# Patient Record
Sex: MALE | Race: BLACK OR AFRICAN AMERICAN | Employment: OTHER | URBAN - METROPOLITAN AREA
[De-identification: names, ages, dates, MRNs, and addresses within clinical notes are randomized per-mention and may not be internally consistent; named-entity substitution may affect disease eponyms.]

---

## 2019-02-08 ENCOUNTER — HOSPITAL ENCOUNTER (EMERGENCY)
Age: 59
Discharge: HOME | End: 2019-02-09
Attending: EMERGENCY MEDICINE
Payer: MEDICAID

## 2019-02-08 ENCOUNTER — APPOINTMENT (OUTPATIENT)
Dept: GENERAL RADIOLOGY | Age: 59
End: 2019-02-08
Attending: EMERGENCY MEDICINE
Payer: MEDICAID

## 2019-02-08 DIAGNOSIS — M79.671 RIGHT FOOT PAIN: Primary | ICD-10-CM

## 2019-02-08 DIAGNOSIS — F20.9 SCHIZOPHRENIA, UNSPECIFIED TYPE (HCC): ICD-10-CM

## 2019-02-08 PROCEDURE — 99285 EMERGENCY DEPT VISIT HI MDM: CPT

## 2019-02-08 PROCEDURE — 73630 X-RAY EXAM OF FOOT: CPT

## 2019-02-09 VITALS
RESPIRATION RATE: 16 BRPM | DIASTOLIC BLOOD PRESSURE: 91 MMHG | SYSTOLIC BLOOD PRESSURE: 123 MMHG | HEIGHT: 67 IN | HEART RATE: 115 BPM | WEIGHT: 151 LBS | BODY MASS INDEX: 23.7 KG/M2 | TEMPERATURE: 99.3 F | OXYGEN SATURATION: 98 %

## 2019-02-09 NOTE — ED PROVIDER NOTES
Emergency Department Treatment Report Patient: Farida Alvarez Age: 62 y.o. Sex: male YOB: 1960 Admit Date: 2/8/2019 PCP: None MRN: [id number]  CSN: [id number] Room: [place] [address] Time Dictated: 12:04 AM   
 
Chief Complaint Chief Complaint Patient presents with  Foot Pain  Mental Health Problem History of Present Illness 62 y.o. male, PMH of schizophrenia presents with acute, moderate, aching right foot pain onset minutes PTA. Pt states his pain began when he was walking here from [facility]. He was heading to his shelter, but his feet were hurting. He reports auditory hallucinations saying they are going to kill him, but he denies SI and HI. Did not take his nighttime Trazadone or Olanzapine, but said that when he takes them, the voices go away. No modifying or alleviating factors factors were reported. No other concerns or symptoms at this time. Review of Systems Constitutional: No fever, chills, or weight loss Eyes: No visual symptoms. ENT: No sore throat, runny nose or ear pain. Respiratory: No cough, dyspnea or wheezing. Cardiovascular: No chest pain, pressure, palpitations, tightness or heaviness. Gastrointestinal: No vomiting, diarrhea or abdominal pain. Genitourinary: No dysuria, frequency, or urgency. Musculoskeletal: No joint pain or swelling. Positive for right foot pain. Integumentary: No rashes. Neurological: No headaches, sensory or motor symptoms. Psychiatric: Positive for auditory hallucinations. Negative for SI and HI Denies complaints in all other systems. Past Medical/Surgical History History reviewed. No pertinent past medical history. History reviewed. No pertinent surgical history. Social History Social History Socioeconomic History  Marital status: SINGLE Spouse name: Not on file  Number of children: Not on file  Years of education: Not on file  Highest education level: Not on file Tobacco Use  
  Smoking status: Current Every Day Smoker  Smokeless tobacco: Never Used Substance and Sexual Activity  Alcohol use: No  
  Frequency: Never  Drug use: No  
 
 
Family History History reviewed. No pertinent family history. Home Medications None Allergies Allergies Allergen Reactions  Pcn [Penicillins] Other (comments) Physical Exam  
 
ED Triage Vitals [02/08/19 2241] ED Encounter Vitals Group BP (!) 123/91 Pulse (Heart Rate) (!) 115 Resp Rate 16 Temp 99.3 °F (37.4 °C) Temp src O2 Sat (%) 98 % Weight 151 lb Height 5' 7\" Constitutional: Patient appears well developed and well nourished. Appearance and behavior are age and situation appropriate. Animated, pleasant. HEENT: Conjunctiva clear. PERRLA. Mucous membranes moist, non-erythematous. Surface of the pharynx, palate, and tongue are pink, moist and without lesions. Neck: supple, non tender, symmetrical, no masses or JVD. Respiratory: lungs clear to auscultation, nonlabored respirations. No tachypnea or accessory muscle use. Cardiovascular: heart regular rate and rhythm without murmur rubs or gallops. Calves soft and non-tender. Distal pulses 2+ and equal bilaterally. No peripheral edema. Gastrointestinal:  Abdomen soft, nontender without complaint of pain to palpation Musculoskeletal: Nail beds pink with prompt capillary refill. Calluses on right foot. Integumentary: warm and dry without rashes or lesions Neurologic: alert and oriented, Sensation intact, motor strength equal and symmetric. No facial asymmetry or dysarthria. Diagnostic Studies Lab:  
No results found for this or any previous visit (from the past 12 hour(s)). Imaging: No results found. Chantel 
 
ED Course/Medical Decision Making Pt with right foot pain after walking. No acute fracture on XR. His pain is over a callus. Foot pain is likely from walking from [facility].  Was walking to shelter, did not make it, stopped at the ED. He denies SI, HI, or increased hallucinations. He took his evening psych medications in the ED. No indications for emergent psychiatric evaluation at this time. Will discharge him and he plans to go to the shelter this morning. Medications - No data to display Final Diagnosis ICD-10-CM ICD-9-CM 1. Right foot pain M79.671 729.5 2. Schizophrenia, unspecified type ([facility] F20.9 295.90 Disposition Patient is discharged home in stable condition. Advised to follow with their primary care physician. Patient advised to return to the ER for any new or worsening symptoms. My Medications You have not been prescribed any medications. Janet Lennon MD 
February 9, 2019 My signature above authenticates this document and my orders, the final   
diagnosis (es), discharge prescription (s), and instructions in the Epic   
record. If you have any questions please contact [phone]. 
  
Nursing notes have been reviewed by the physician/ advanced practice   
Clinician. Scribe Attestation Jenny Conte acting as a scribe for and in the presence of Caroline Mccallum MD     
February 09, 2019 at 12:05 AM 
    
Provider Attestation:     
I personally performed the services described in the documentation, reviewed the documentation, as recorded by the scribe in my presence, and it accurately and completely records my words and actions.  February 09, 2019 at 12:05 AM - Caroline Mccallum MD

## 2019-02-09 NOTE — DISCHARGE INSTRUCTIONS
Patient Education        Foot Pain: Care Instructions  Your Care Instructions  Foot injuries that cause pain and swelling are fairly common. Almost all sports or home repair projects can cause a misstep that ends up as foot pain. Normal wear and tear, especially as you get older, also can cause foot pain. Most minor foot injuries will heal on their own, and home treatment is usually all you need to do. If you have a severe injury, you may need tests and treatment. Follow-up care is a key part of your treatment and safety. Be sure to make and go to all appointments, and call your doctor if you are having problems. It's also a good idea to know your test results and keep a list of the medicines you take. How can you care for yourself at home? · Take pain medicines exactly as directed. ? If the doctor gave you a prescription medicine for pain, take it as prescribed. ? If you are not taking a prescription pain medicine, ask your doctor if you can take an over-the-counter medicine. · Rest and protect your foot. Take a break from any activity that may cause pain. · Put ice or a cold pack on your foot for 10 to 20 minutes at a time. Put a thin cloth between the ice and your skin. · Prop up the sore foot on a pillow when you ice it or anytime you sit or lie down during the next 3 days. Try to keep it above the level of your heart. This will help reduce swelling. · Your doctor may recommend that you wrap your foot with an elastic bandage. Keep your foot wrapped for as long as your doctor advises. · If your doctor recommends crutches, use them as directed. · Wear roomy footwear. · As soon as pain and swelling end, begin gentle exercises of your foot. Your doctor can tell you which exercises will help. When should you call for help? Call 911 anytime you think you may need emergency care.  For example, call if:    · Your foot turns pale, white, blue, or cold.    Call your doctor now or seek immediate medical care if:    · You cannot move or stand on your foot.     · Your foot looks twisted or out of its normal position.     · Your foot is not stable when you step down.     · You have signs of infection, such as:  ? Increased pain, swelling, warmth, or redness. ? Red streaks leading from the sore area. ? Pus draining from a place on your foot. ? A fever.     · Your foot is numb or tingly.    Watch closely for changes in your health, and be sure to contact your doctor if:    · You do not get better as expected.     · You have bruises from an injury that last longer than 2 weeks. Where can you learn more? Go to http://noam-rere.info/. Enter J694 in the search box to learn more about \"Foot Pain: Care Instructions. \"  Current as of: September 20, 2018  Content Version: 11.9  © 7278-4393 [employer]. Care instructions adapted under license by [employer] (which disclaims liability or warranty for this information). If you have questions about a medical condition or this instruction, always ask your healthcare professional. [place] [address] any warranty or liability for your use of this information. Patient Education        Schizophrenia: Care Instructions  Your Care Instructions  Schizophrenia is a disease that makes it hard to think clearly, manage emotions, and interact with other people. It can cause:  · Delusions. These are beliefs that are not real.  · Hallucinations. These are things that you may see or hear that are not really there. · Paranoia. This is the belief that others are lying, cheating, using you, or trying to harm you. The disease may change your ability to enjoy life, express emotions, or function. At times, you may hear voices, behave strangely, have trouble speaking or understanding speech, or keep to yourself. The goal of treatment is to lower your stress and help your brain function normally.  You may need lifelong treatment with medicines and counseling to keep your schizophrenia under control. When schizophrenia is not treated, the risks are higher for suicide, a hospital stay, and other problems. Early treatment called coordinated specialty care [facility]) may help a person who is having his or her first episode of psychotic thoughts. Ask your doctor about [address]. Follow-up care is a key part of your treatment and safety. Be sure to make and go to all appointments, and call your doctor if you are having problems. It's also a good idea to know your test results and keep a list of the medicines you take. How can you care for yourself at home? · Be safe with medicines. Take your medicines exactly as prescribed. Call your doctor if you think you are having a problem with your medicine. When you feel good, you may think that you do not need your medicines. But it is important to keep taking them as scheduled. · Go to your counseling sessions. Call and talk with your counselor if you can't attend or if you don't think the sessions are helping. Do not just stop going. · Eat a healthy diet. Talk with a dietitian about what type of diet may be best for you. · Do not use alcohol or illegal drugs. · Keep the numbers for these national suicide hotlines: 7-515-280-TALK (2-945.409.9751) and 3-647-NVEWFJJ (8-665.360.3244). If you or someone you know talks about suicide or feeling hopeless, get help right away. What should you do if someone in your family has schizophrenia? · Learn about the disease and how it may get worse over time. · Remind your family member that you love him or her. · Make a plan with all family members about how to take care of your loved one when his or her symptoms are bad. · Talk about your fears and concerns and those of other family members. Seek counseling if needed. · Do not focus attention only on the person who is in treatment. · Remind yourself that it will take time for changes to occur.   · Do not blame yourself for the disease. · Know your legal rights and the legal rights of your family member. · Take care of yourself. Stay involved with your own interests, such as your career, hobbies, and friends. Use exercise, positive self-talk, relaxation, and deep breathing to help lower your stress. · Give yourself time to grieve. You may need to deal with emotions such as anger, fear, and frustration. After you work through your feelings, you will be better able to care for yourself and your family. · If you are having a hard time with your feelings and your interactions with your family member, talk with a counselor. When should you call for help? Call 911 anytime you think you may need emergency care. For example, call if:    · You are thinking about suicide or are threatening suicide.     · You feel like hurting yourself or someone else.    Call your doctor now or seek immediate medical care if:    · You hear voices.     · You think someone is trying to harm you.     · You cannot concentrate or are easily confused.    Watch closely for changes in your health, and be sure to contact your doctor if:    · You are having trouble taking care of yourself.     · You cannot attend your counseling sessions. Where can you learn more? Go to http://noam-rere.info/. Enter S742 in the search box to learn more about \"Schizophrenia: Care Instructions. \"  Current as of: September 11, 2018  Content Version: 11.9  © 0628-5344 [employer]. Care instructions adapted under license by [employer] (which disclaims liability or warranty for this information). If you have questions about a medical condition or this instruction, always ask your healthcare professional. [address] any warranty or liability for your use of this information.

## 2019-02-09 NOTE — ED TRIAGE NOTES
Right foot pain began while walking here from [facility]. States he can't make it to the shelter. Reports hearing voices saying they are going to kill him. Denies SI/HI.

## 2019-02-09 NOTE — ED NOTES
Patient willingly changed into paper gown and was compliant with belongings being checked and safely secured in locker #2. Patient belongings include: 
 
-One wallet 
-One specimen container containing (2) silver rings 
-One large blue jacket -(2) T-shirts, grey and red 
-One pair of blue sweatpants 
-One pair of grey underwear 
-One pair of tennis shoes 
-One hat 
-One trashbag brought in by patient, with patient's name written in marker on tape label

## 2019-02-12 ENCOUNTER — HOSPITAL ENCOUNTER (EMERGENCY)
Age: 59
Discharge: PSYCHIATRIC OR SUBSTANCE USE FACILITY | End: 2019-02-15
Attending: EMERGENCY MEDICINE | Admitting: EMERGENCY MEDICINE
Payer: MEDICAID

## 2019-02-12 ENCOUNTER — APPOINTMENT (OUTPATIENT)
Dept: GENERAL RADIOLOGY | Age: 59
End: 2019-02-12
Attending: EMERGENCY MEDICINE
Payer: MEDICAID

## 2019-02-12 DIAGNOSIS — R44.3 HALLUCINATIONS: Primary | ICD-10-CM

## 2019-02-12 DIAGNOSIS — J10.1 INFLUENZA A: ICD-10-CM

## 2019-02-12 LAB
ALBUMIN SERPL-MCNC: 3.2 G/DL (ref 3.4–5)
ALBUMIN/GLOB SERPL: 0.8 {RATIO} (ref 0.8–1.7)
ALP SERPL-CCNC: 82 U/L (ref 45–117)
ALT SERPL-CCNC: 59 U/L (ref 16–61)
AMPHET UR QL SCN: NEGATIVE
ANION GAP SERPL CALC-SCNC: 8 MMOL/L (ref 3–18)
APPEARANCE UR: CLEAR
AST SERPL-CCNC: 115 U/L (ref 15–37)
BARBITURATES UR QL SCN: NEGATIVE
BASOPHILS # BLD: 0 K/UL (ref 0–0.1)
BASOPHILS NFR BLD: 0 % (ref 0–2)
BENZODIAZ UR QL: NEGATIVE
BILIRUB SERPL-MCNC: 0.3 MG/DL (ref 0.2–1)
BILIRUB UR QL: NEGATIVE
BUN SERPL-MCNC: 14 MG/DL (ref 7–18)
BUN/CREAT SERPL: 12 (ref 12–20)
CALCIUM SERPL-MCNC: 8.4 MG/DL (ref 8.5–10.1)
CANNABINOIDS UR QL SCN: NEGATIVE
CHLORIDE SERPL-SCNC: 102 MMOL/L (ref 100–108)
CO2 SERPL-SCNC: 26 MMOL/L (ref 21–32)
COCAINE UR QL SCN: NEGATIVE
COLOR UR: YELLOW
CREAT SERPL-MCNC: 1.19 MG/DL (ref 0.6–1.3)
DIFFERENTIAL METHOD BLD: ABNORMAL
EOSINOPHIL # BLD: 0.1 K/UL (ref 0–0.4)
EOSINOPHIL NFR BLD: 1 % (ref 0–5)
ERYTHROCYTE [DISTWIDTH] IN BLOOD BY AUTOMATED COUNT: 13.1 % (ref 11.6–14.5)
ETHANOL SERPL-MCNC: <3 MG/DL (ref 0–3)
FLUAV AG NPH QL IA: POSITIVE
FLUBV AG NOSE QL IA: NEGATIVE
GLOBULIN SER CALC-MCNC: 3.9 G/DL (ref 2–4)
GLUCOSE SERPL-MCNC: 116 MG/DL (ref 74–99)
GLUCOSE UR STRIP.AUTO-MCNC: NEGATIVE MG/DL
HCT VFR BLD AUTO: 35.2 % (ref 36–48)
HDSCOM,HDSCOM: NORMAL
HGB BLD-MCNC: 11.5 G/DL (ref 13–16)
HGB UR QL STRIP: NEGATIVE
KETONES UR QL STRIP.AUTO: NEGATIVE MG/DL
LACTATE BLD-SCNC: 0.71 MMOL/L (ref 0.4–2)
LEUKOCYTE ESTERASE UR QL STRIP.AUTO: NEGATIVE
LYMPHOCYTES # BLD: 2 K/UL (ref 0.9–3.6)
LYMPHOCYTES NFR BLD: 35 % (ref 21–52)
MCH RBC QN AUTO: 28.8 PG (ref 24–34)
MCHC RBC AUTO-ENTMCNC: 32.7 G/DL (ref 31–37)
MCV RBC AUTO: 88 FL (ref 74–97)
METHADONE UR QL: NEGATIVE
MONOCYTES # BLD: 1.2 K/UL (ref 0.05–1.2)
MONOCYTES NFR BLD: 21 % (ref 3–10)
NEUTS SEG # BLD: 2.4 K/UL (ref 1.8–8)
NEUTS SEG NFR BLD: 43 % (ref 40–73)
NITRITE UR QL STRIP.AUTO: NEGATIVE
OPIATES UR QL: NEGATIVE
PCP UR QL: NEGATIVE
PH UR STRIP: 6.5 [PH] (ref 5–8)
PLATELET # BLD AUTO: 175 K/UL (ref 135–420)
PMV BLD AUTO: 9.8 FL (ref 9.2–11.8)
POTASSIUM SERPL-SCNC: 3.2 MMOL/L (ref 3.5–5.5)
PROT SERPL-MCNC: 7.1 G/DL (ref 6.4–8.2)
PROT UR STRIP-MCNC: NEGATIVE MG/DL
RBC # BLD AUTO: 4 M/UL (ref 4.7–5.5)
SODIUM SERPL-SCNC: 136 MMOL/L (ref 136–145)
SP GR UR REFRACTOMETRY: 1.01 (ref 1–1.03)
UROBILINOGEN UR QL STRIP.AUTO: 1 EU/DL (ref 0.2–1)
VALPROATE SERPL-MCNC: 4 UG/ML (ref 50–100)
WBC # BLD AUTO: 5.7 K/UL (ref 4.6–13.2)

## 2019-02-12 PROCEDURE — 87040 BLOOD CULTURE FOR BACTERIA: CPT

## 2019-02-12 PROCEDURE — 87804 INFLUENZA ASSAY W/OPTIC: CPT

## 2019-02-12 PROCEDURE — 81003 URINALYSIS AUTO W/O SCOPE: CPT

## 2019-02-12 PROCEDURE — 96374 THER/PROPH/DIAG INJ IV PUSH: CPT

## 2019-02-12 PROCEDURE — 85025 COMPLETE CBC W/AUTO DIFF WBC: CPT

## 2019-02-12 PROCEDURE — 80307 DRUG TEST PRSMV CHEM ANLYZR: CPT

## 2019-02-12 PROCEDURE — 74011250636 HC RX REV CODE- 250/636: Performed by: EMERGENCY MEDICINE

## 2019-02-12 PROCEDURE — 80053 COMPREHEN METABOLIC PANEL: CPT

## 2019-02-12 PROCEDURE — 83605 ASSAY OF LACTIC ACID: CPT

## 2019-02-12 PROCEDURE — 74011250637 HC RX REV CODE- 250/637: Performed by: EMERGENCY MEDICINE

## 2019-02-12 PROCEDURE — 71046 X-RAY EXAM CHEST 2 VIEWS: CPT

## 2019-02-12 PROCEDURE — 99285 EMERGENCY DEPT VISIT HI MDM: CPT

## 2019-02-12 PROCEDURE — 80164 ASSAY DIPROPYLACETIC ACD TOT: CPT

## 2019-02-12 PROCEDURE — 96361 HYDRATE IV INFUSION ADD-ON: CPT

## 2019-02-12 RX ORDER — ACETAMINOPHEN 500 MG
1000 TABLET ORAL
Status: COMPLETED | OUTPATIENT
Start: 2019-02-12 | End: 2019-02-12

## 2019-02-12 RX ORDER — OSELTAMIVIR PHOSPHATE 75 MG/1
75 CAPSULE ORAL 2 TIMES DAILY
Qty: 9 CAP | Refills: 0 | Status: SHIPPED | OUTPATIENT
Start: 2019-02-13 | End: 2019-02-15

## 2019-02-12 RX ORDER — GUAIFENESIN/DEXTROMETHORPHAN 100-10MG/5
10 SYRUP ORAL
Status: DISCONTINUED | OUTPATIENT
Start: 2019-02-12 | End: 2019-02-15 | Stop reason: HOSPADM

## 2019-02-12 RX ORDER — MAGNESIUM SULFATE HEPTAHYDRATE 40 MG/ML
2 INJECTION, SOLUTION INTRAVENOUS ONCE
Status: COMPLETED | OUTPATIENT
Start: 2019-02-12 | End: 2019-02-12

## 2019-02-12 RX ORDER — OSELTAMIVIR PHOSPHATE 75 MG/1
75 CAPSULE ORAL 2 TIMES DAILY
Status: DISCONTINUED | OUTPATIENT
Start: 2019-02-12 | End: 2019-02-15 | Stop reason: HOSPADM

## 2019-02-12 RX ORDER — POTASSIUM CHLORIDE 20 MEQ/1
40 TABLET, EXTENDED RELEASE ORAL
Status: COMPLETED | OUTPATIENT
Start: 2019-02-12 | End: 2019-02-12

## 2019-02-12 RX ORDER — GUAIFENESIN/DEXTROMETHORPHAN 100-10MG/5
10 SYRUP ORAL
Status: COMPLETED | OUTPATIENT
Start: 2019-02-12 | End: 2019-02-12

## 2019-02-12 RX ORDER — ACETAMINOPHEN 500 MG
1000 TABLET ORAL
Status: DISCONTINUED | OUTPATIENT
Start: 2019-02-12 | End: 2019-02-15 | Stop reason: HOSPADM

## 2019-02-12 RX ADMIN — MAGNESIUM SULFATE HEPTAHYDRATE 2 G: 40 INJECTION, SOLUTION INTRAVENOUS at 22:25

## 2019-02-12 RX ADMIN — OSELTAMIVIR PHOSPHATE 75 MG: 75 CAPSULE ORAL at 21:19

## 2019-02-12 RX ADMIN — GUAIFENESIN AND DEXTROMETHORPHAN 10 ML: 100; 10 SYRUP ORAL at 21:19

## 2019-02-12 RX ADMIN — SODIUM CHLORIDE 1000 ML: 900 INJECTION, SOLUTION INTRAVENOUS at 20:28

## 2019-02-12 RX ADMIN — ACETAMINOPHEN 1000 MG: 500 TABLET, FILM COATED ORAL at 20:27

## 2019-02-12 RX ADMIN — POTASSIUM CHLORIDE 40 MEQ: 20 TABLET, EXTENDED RELEASE ORAL at 22:25

## 2019-02-13 PROCEDURE — 74011250637 HC RX REV CODE- 250/637: Performed by: EMERGENCY MEDICINE

## 2019-02-13 RX ORDER — RISPERIDONE 2 MG/1
6 TABLET, FILM COATED ORAL
Status: DISCONTINUED | OUTPATIENT
Start: 2019-02-13 | End: 2019-02-15 | Stop reason: HOSPADM

## 2019-02-13 RX ORDER — OLANZAPINE 10 MG/1
40 TABLET ORAL EVERY EVENING
Status: DISCONTINUED | OUTPATIENT
Start: 2019-02-13 | End: 2019-02-15 | Stop reason: HOSPADM

## 2019-02-13 RX ORDER — TOPIRAMATE 25 MG/1
50 TABLET ORAL 2 TIMES DAILY WITH MEALS
Status: DISCONTINUED | OUTPATIENT
Start: 2019-02-13 | End: 2019-02-15 | Stop reason: HOSPADM

## 2019-02-13 RX ORDER — TRAZODONE HYDROCHLORIDE 50 MG/1
100 TABLET ORAL
Status: DISCONTINUED | OUTPATIENT
Start: 2019-02-13 | End: 2019-02-15 | Stop reason: HOSPADM

## 2019-02-13 RX ORDER — BENZTROPINE MESYLATE 1 MG/1
1 TABLET ORAL 2 TIMES DAILY
Status: DISCONTINUED | OUTPATIENT
Start: 2019-02-13 | End: 2019-02-15 | Stop reason: HOSPADM

## 2019-02-13 RX ADMIN — TRAZODONE HYDROCHLORIDE 100 MG: 50 TABLET ORAL at 22:08

## 2019-02-13 RX ADMIN — OLANZAPINE 40 MG: 10 TABLET, FILM COATED ORAL at 20:50

## 2019-02-13 RX ADMIN — TOPIRAMATE 50 MG: 25 TABLET, FILM COATED ORAL at 18:53

## 2019-02-13 RX ADMIN — OSELTAMIVIR PHOSPHATE 75 MG: 75 CAPSULE ORAL at 09:17

## 2019-02-13 RX ADMIN — RISPERIDONE 6 MG: 2 TABLET ORAL at 20:48

## 2019-02-13 RX ADMIN — BENZTROPINE MESYLATE 1 MG: 1 TABLET ORAL at 18:54

## 2019-02-13 RX ADMIN — OSELTAMIVIR PHOSPHATE 75 MG: 75 CAPSULE ORAL at 18:54

## 2019-02-13 RX ADMIN — GUAIFENESIN AND DEXTROMETHORPHAN 10 ML: 100; 10 SYRUP ORAL at 10:34

## 2019-02-13 NOTE — PROGRESS NOTES
Spoke with Austin Mejia and states can't block a room for pt. She will call [facility] tomorrow and see why they can't accept pt. She will discuss case with [facility] tomorrow.

## 2019-02-13 NOTE — PROGRESS NOTES
Spoke with Milton Mcdonald from [place] [address] and states that [facility] will not accept pt due to +flu. ED MD made aware and requested for a tele psych consult and ordered.

## 2019-02-13 NOTE — ED NOTES
Purposeful rounding completed: 
 
Side rails up x 2:  YES Bed in low position and wheels locked: YES Call bell within reach: YES Comfort addressed: YES Toileting needs addressed: YES Plan of care reviewed/updated with patient and or family members: YES 
IV site assessed: YES Pain assessed and addressed: YES, 0 Sitter at bedside

## 2019-02-13 NOTE — ED NOTES
Purposeful rounding completed: 
 
Side rails up x 2:  YES Bed in low position and wheels locked: YES Call bell within reach: YES Comfort addressed: YES Toileting needs addressed: YES Plan of care reviewed/updated with patient and or family members: YES 
IV site assessed: YES Pain assessed and addressed: YES, 0

## 2019-02-13 NOTE — ED NOTES
Spoke to [place], [facility] at with [employer] She needs lab results in order to present case and possibly get a bed for this pt. Will fax his lab results to [phone]
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
## 2019-02-13 NOTE — ED NOTES
Vitals: 
Patient Vitals for the past 12 hrs: 
 Temp BP SpO2  
02/13/19 2052 98 °F (36.7 °C)    
02/13/19 2000  97/53 99 % Medications ordered:  
Medications  
oseltamivir (TAMIFLU) capsule 75 mg (75 mg Oral Given 2/13/19 1854) acetaminophen (TYLENOL) tablet 1,000 mg (not administered) guaiFENesin-dextromethorphan (ROBITUSSIN DM) 100-10 mg/5 mL syrup 10 mL (10 mL Oral Given 2/13/19 1034)  
benztropine (COGENTIN) tablet 1 mg (1 mg Oral Given 2/13/19 1854) risperiDONE (RisperDAL) tablet 6 mg (0 mg Oral Held 2/13/19 2200) topiramate (TOPAMAX) tablet 50 mg (50 mg Oral Given 2/13/19 1853) traZODone (DESYREL) tablet 100 mg (100 mg Oral Given 2/13/19 2208) OLANZapine (ZyPREXA) tablet 40 mg (40 mg Oral Given 2/13/19 2050)  
acetaminophen (TYLENOL) tablet 1,000 mg (1,000 mg Oral Given 2/12/19 2027)  
sodium chloride 0.9 % bolus infusion 1,000 mL (0 mL IntraVENous IV Completed 2/12/19 2128) guaiFENesin-dextromethorphan (ROBITUSSIN DM) 100-10 mg/5 mL syrup 10 mL (10 mL Oral Given 2/12/19 2119) potassium chloride (K-DUR, KLOR-CON) SR tablet 40 mEq (40 mEq Oral Given 2/12/19 2225)  
magnesium sulfate 2 g/50 ml IVPB (premix or compounded) (0 g IntraVENous IV Completed 2/12/19 2240) Lab findings: 
No results found for this or any previous visit (from the past 12 hour(s)). EKG interpretation by ED Physician: X-Ray, CT or other radiology findings or impressions: 
XR CHEST PA LAT Final Result IMPRESSION:  
  
No acute cardiopulmonary disease. Preliminary report provided by on-call radiology resident. Progress notes, Consult notes or additional Procedure notes:  
T/o from dr Rachael Dupree. Per repeat conversation with [facility] today will not take at [facility] given active influenza Needs documentation that pt not infective Reviewed telepsych consult and placed back on his meds that were obtained from [facility] Will get ID consult in am for opinion regarding pt infective state Will t/o dr Melanie Oconnell to f/u and obtain consult Reevaluation of patient:  
stable Disposition: 
Diagnosis: 1. Hallucinations 2. Influenza A Disposition: pending Follow-up Information None Medication List  
  
START taking these medications   
oseltamivir 75 mg capsule Commonly known as:  TAMIFLU Take 1 Cap by mouth two (2) times a day for 9 doses. Where to Get Your Medications Information about where to get these medications is not yet available Ask your nurse or doctor about these medications · oseltamivir 75 mg capsule

## 2019-02-13 NOTE — PROGRESS NOTES
Jeff Humphries from [facility] left a message and states that Farhad Doyle, director, is working on the case.

## 2019-02-13 NOTE — ED NOTES
Purposeful rounding completed: 
 
Side rails up x 2:  YES Bed in low position and wheels locked: YES Call bell within reach: YES Comfort addressed: YES Toileting needs addressed: YES Plan of care reviewed/updated with patient and or family members: YES 
IV site assessed: YES Pain assessed and addressed: YES, 0 Pt on droplet precautions, pt resting quietly; urinal at bedside, sitter at bedside

## 2019-02-13 NOTE — PROGRESS NOTES
Seen by tele psych and recommends inpt admission. Referred to [facility] and pt information given to [facility] at .

## 2019-02-13 NOTE — ED PROVIDER NOTES
Kaylyn Mendoza is a 62 y.o. Male who was brought in by police after [facility] rec he come in for medical clearance for c[facility] placement as he has been hearing voices, not acting right. Per pt he has been off meds recently. Also with cough, congestion for last day as well. States he was at [facility] earlier but they did not give him anything for his cough. Denies fcs, nvd, headache, sore throat, recent admission. Denies si, hi.  
 
 
The history is provided by the patient and the police. History reviewed. No pertinent past medical history. History reviewed. No pertinent surgical history. History reviewed. No pertinent family history. Social History Socioeconomic History  Marital status: SINGLE Spouse name: Not on file  Number of children: Not on file  Years of education: Not on file  Highest education level: Not on file Social Needs  Financial resource strain: Not on file  Food insecurity - worry: Not on file  Food insecurity - inability: Not on file  Transportation needs - medical: Not on file  Transportation needs - non-medical: Not on file Occupational History  Not on file Tobacco Use  Smoking status: Current Every Day Smoker  Smokeless tobacco: Never Used Substance and Sexual Activity  Alcohol use: No  
  Frequency: Never  Drug use: No  
 Sexual activity: Not on file Other Topics Concern  Not on file Social History Narrative  Not on file ALLERGIES: Pcn [penicillins] Review of Systems Constitutional: Positive for chills. HENT: Positive for congestion. Negative for sore throat and trouble swallowing. Eyes: Negative for visual disturbance. Respiratory: Positive for cough. Negative for shortness of breath and wheezing. Cardiovascular: Negative for chest pain. Gastrointestinal: Negative for abdominal pain, diarrhea, nausea and vomiting. Endocrine: Negative for polyuria. Genitourinary: Negative for difficulty urinating, dysuria and urgency. Musculoskeletal: Negative for gait problem. Skin: Negative for rash. Allergic/Immunologic: Negative for immunocompromised state. Neurological: Negative for dizziness, syncope and light-headedness. Psychiatric/Behavioral: Positive for hallucinations and sleep disturbance. Vitals:  
 02/12/19 1952 02/12/19 2115 02/12/19 2130 02/12/19 2143 BP: 121/82 119/68 117/73 Pulse: (!) 108 (!) 102 86 Resp: 16 26 18 Temp: (!) 101.2 °F (38.4 °C)   98.9 °F (37.2 °C) SpO2: 95% 98% 100% Weight: 67.8 kg (149 lb 8 oz) Height: 5' 7\" (1.702 m) Physical Exam  
Constitutional: He is oriented to person, place, and time. Non-toxic appearance. He does not have a sickly appearance. He does not appear ill. No distress. HENT:  
Head: Normocephalic and atraumatic. Right Ear: External ear normal.  
Left Ear: External ear normal.  
Nose: Nose normal.  
Mouth/Throat: Oropharynx is clear and moist. No oropharyngeal exudate. Eyes: Conjunctivae are normal. Right eye exhibits no discharge. Left eye exhibits no discharge. No scleral icterus. Neck: Normal range of motion. Cardiovascular: Normal rate, regular rhythm, normal heart sounds and intact distal pulses. Pulmonary/Chest: Effort normal and breath sounds normal. No stridor. No respiratory distress. He has no wheezes. He has no rales. Abdominal: Soft. There is no tenderness. Musculoskeletal: Normal range of motion. He exhibits no edema. Lymphadenopathy:  
  He has no cervical adenopathy. Neurological: He is alert and oriented to person, place, and time. Skin: Skin is warm and dry. Capillary refill takes less than 2 seconds. No rash noted. He is not diaphoretic. Psychiatric: He has a normal mood and affect. His behavior is normal. His speech is not rapid and/or pressured.  Thought content is delusional. He expresses no homicidal and no suicidal ideation. He expresses no suicidal plans and no homicidal plans. Nursing note and vitals reviewed. MDM Procedures Vitals: 
Patient Vitals for the past 12 hrs: 
 Temp Pulse Resp BP SpO2  
02/12/19 2143 98.9 °F (37.2 °C)      
02/12/19 2130  86 18 117/73 100 % 02/12/19 2115  (!) 102 26 119/68 98 % 02/12/19 1952 (!) 101.2 °F (38.4 °C) (!) 108 16 121/82 95 % Medications ordered:  
Medications  
oseltamivir (TAMIFLU) capsule 75 mg (75 mg Oral Given 2/12/19 2119)  
magnesium sulfate 2 g/50 ml IVPB (premix or compounded) (2 g IntraVENous New Bag 2/12/19 2225)  
acetaminophen (TYLENOL) tablet 1,000 mg (1,000 mg Oral Given 2/12/19 2027)  
sodium chloride 0.9 % bolus infusion 1,000 mL (0 mL IntraVENous IV Completed 2/12/19 2128) guaiFENesin-dextromethorphan (ROBITUSSIN DM) 100-10 mg/5 mL syrup 10 mL (10 mL Oral Given 2/12/19 2119) potassium chloride (K-DUR, KLOR-CON) SR tablet 40 mEq (40 mEq Oral Given 2/12/19 2225) Lab findings: 
Recent Results (from the past 12 hour(s)) INFLUENZA A & B AG (RAPID TEST) Collection Time: 02/12/19  8:15 PM  
Result Value Ref Range Influenza A Antigen POSITIVE (A) NEG Influenza B Antigen NEGATIVE  NEG    
CBC WITH AUTOMATED DIFF Collection Time: 02/12/19  8:20 PM  
Result Value Ref Range WBC 5.7 4.6 - 13.2 K/uL  
 RBC 4.00 (L) 4.70 - 5.50 M/uL  
 HGB 11.5 (L) 13.0 - 16.0 g/dL HCT 35.2 (L) 36.0 - 48.0 % MCV 88.0 74.0 - 97.0 FL  
 MCH 28.8 24.0 - 34.0 PG  
 MCHC 32.7 31.0 - 37.0 g/dL  
 RDW 13.1 11.6 - 14.5 % PLATELET 028 262 - 683 K/uL MPV 9.8 9.2 - 11.8 FL  
 NEUTROPHILS 43 40 - 73 % LYMPHOCYTES 35 21 - 52 % MONOCYTES 21 (H) 3 - 10 % EOSINOPHILS 1 0 - 5 % BASOPHILS 0 0 - 2 %  
 ABS. NEUTROPHILS 2.4 1.8 - 8.0 K/UL  
 ABS. LYMPHOCYTES 2.0 0.9 - 3.6 K/UL  
 ABS. MONOCYTES 1.2 0.05 - 1.2 K/UL  
 ABS. EOSINOPHILS 0.1 0.0 - 0.4 K/UL  
 ABS. BASOPHILS 0.0 0.0 - 0.1 K/UL DF AUTOMATED METABOLIC PANEL, COMPREHENSIVE Collection Time: 02/12/19  8:20 PM  
Result Value Ref Range Sodium 136 136 - 145 mmol/L Potassium 3.2 (L) 3.5 - 5.5 mmol/L Chloride 102 100 - 108 mmol/L  
 CO2 26 21 - 32 mmol/L Anion gap 8 3.0 - 18 mmol/L Glucose 116 (H) 74 - 99 mg/dL BUN 14 7.0 - 18 MG/DL Creatinine 1.19 0.6 - 1.3 MG/DL  
 BUN/Creatinine ratio 12 12 - 20 GFR est AA >60 >60 ml/min/1.73m2 GFR est non-AA >60 >60 ml/min/1.73m2 Calcium 8.4 (L) 8.5 - 10.1 MG/DL Bilirubin, total 0.3 0.2 - 1.0 MG/DL  
 ALT (SGPT) 59 16 - 61 U/L  
 AST (SGOT) 115 (H) 15 - 37 U/L Alk. phosphatase 82 45 - 117 U/L Protein, total 7.1 6.4 - 8.2 g/dL Albumin 3.2 (L) 3.4 - 5.0 g/dL Globulin 3.9 2.0 - 4.0 g/dL A-G Ratio 0.8 0.8 - 1.7 ETHYL ALCOHOL Collection Time: 02/12/19  8:20 PM  
Result Value Ref Range ALCOHOL(ETHYL),SERUM <3 0 - 3 MG/DL  
DRUG SCREEN, URINE Collection Time: 02/12/19  8:20 PM  
Result Value Ref Range BENZODIAZEPINES NEGATIVE  NEG    
 BARBITURATES NEGATIVE  NEG    
 THC (TH-CANNABINOL) NEGATIVE  NEG    
 OPIATES NEGATIVE  NEG    
 PCP(PHENCYCLIDINE) NEGATIVE  NEG    
 COCAINE NEGATIVE  NEG    
 AMPHETAMINES NEGATIVE  NEG METHADONE NEGATIVE  NEG HDSCOM (NOTE) URINALYSIS W/ RFLX MICROSCOPIC Collection Time: 02/12/19  8:20 PM  
Result Value Ref Range Color YELLOW Appearance CLEAR Specific gravity 1.006 1.005 - 1.030    
 pH (UA) 6.5 5.0 - 8.0 Protein NEGATIVE  NEG mg/dL Glucose NEGATIVE  NEG mg/dL Ketone NEGATIVE  NEG mg/dL Bilirubin NEGATIVE  NEG Blood NEGATIVE  NEG Urobilinogen 1.0 0.2 - 1.0 EU/dL Nitrites NEGATIVE  NEG Leukocyte Esterase NEGATIVE  NEG    
VALPROIC ACID Collection Time: 02/12/19  8:20 PM  
Result Value Ref Range Valproic acid 4 (L) 50 - 100 ug/ml POC LACTIC ACID Collection Time: 02/12/19  8:22 PM  
Result Value Ref Range Lactic Acid (POC) 0.71 0.40 - 2.00 mmol/L  
 
 
EKG interpretation by ED Physician: X-Ray, CT or other radiology findings or impressions: 
XR CHEST PA LAT    (Results Pending)  
nap per prelim reading Progress notes, Consult notes or additional Procedure notes:  
Looks well. Doubt need for medical admission. D/w [facility] and active influenza not contraindicated for [facility] placement Reevaluation of patient:  
Stable with no acute medical condition that would prevent transfer to mental health facility Disposition: 
Diagnosis: 1. Hallucinations 2. Influenza A Disposition: transfer csu when bed available Follow-up Information None Medication List  
  
START taking these medications   
oseltamivir 75 mg capsule Commonly known as:  TAMIFLU Take 1 Cap by mouth two (2) times a day for 9 doses. Start taking on:  2/13/2019 Where to Get Your Medications Information about where to get these medications is not yet available Ask your nurse or doctor about these medications · oseltamivir 75 mg capsule

## 2019-02-14 PROCEDURE — 74011250637 HC RX REV CODE- 250/637: Performed by: EMERGENCY MEDICINE

## 2019-02-14 RX ADMIN — OSELTAMIVIR PHOSPHATE 75 MG: 75 CAPSULE ORAL at 20:17

## 2019-02-14 RX ADMIN — OLANZAPINE 40 MG: 10 TABLET, FILM COATED ORAL at 21:04

## 2019-02-14 RX ADMIN — TOPIRAMATE 50 MG: 25 TABLET, FILM COATED ORAL at 20:17

## 2019-02-14 RX ADMIN — BENZTROPINE MESYLATE 1 MG: 1 TABLET ORAL at 11:36

## 2019-02-14 RX ADMIN — TOPIRAMATE 50 MG: 25 TABLET, FILM COATED ORAL at 11:36

## 2019-02-14 RX ADMIN — OSELTAMIVIR PHOSPHATE 75 MG: 75 CAPSULE ORAL at 11:36

## 2019-02-14 RX ADMIN — BENZTROPINE MESYLATE 1 MG: 1 TABLET ORAL at 20:17

## 2019-02-14 NOTE — ED NOTES
Spoke with Pavithra Espino from [facility] due to the patient flu status and the need for droplet precautions recommendation until 2/17 per the ID attending, they patient is unable to be accepted at this time.

## 2019-02-14 NOTE — CONSULTS
INFECTIOUS DISEASE CONSULT NOTE       Requested by: Dr Vamshi Grover    Reason for consult: Influenza and isolation need    Date of admission: 2/12/2019    Date of consult: February 14, 2019      ABX:     Current abx Prior abx    Tamiflu 2/13-1      ASSESSMENT: -- RECOMMENDATIONS      Influenza A  - symptom onset 2/10- sudden  - presented with fever, cough, headaches and sore throat  - CXR with no pneumonia  - suspect non complicated   - Continue Tamiflu and plan 5 days course  - Continue droplet precautions  - Pt can be transfered to [facility] but as per CDC guidelines- \"Droplet precautions should be implemented for 7 days after illness onset or until 24 hours after the resolution of fever and respiratory symptoms, whichever is longer, while a patient is in a healthcare facility\"  - pt is afebrile for >24 hrs and will need droplet precautions until 2/17 (unless re-spikes or can be discharged home)  - d/w Dr Vamshi Grover   Schizoaffective disorder- pt with hallucination  - evaluated by Psych  - Plan for transfer to [facility]                             MICROBIOLOGY:     2/12 Influenza A +ve    LINES/DRAINS:     PIV    HPI:     Mr. Andrew Tam is a 62 y.o. Male with PMH of Schizoaffective disorder who was brought to ED2/12 for evaluation as has been acting differently. Pt is not able to provide good history. He was found by police as hearing voices, not acting right. Per pt he has been off his psych meds recently. He c/o cough, sore throat, congestion and high grade fever of 1 day duration and went to [facility] but nothing specific was done there. He was found by police and brought to [place] [address] ED for evaluation. He was tested for Influenza in ED and was found positive for A. His CXR was neg for any infiltrates. He was started on Tamiflu. He was evaluated by Psych and recommended to transfer to [place] [id number]. Facility is refusing transfer given Influenza.  We were asked for further eval and management. Past medical history:   History reviewed. No pertinent past medical history. Schizoaffective disorder    Social History:     Social History     Socioeconomic History    Marital status: SINGLE     Spouse name: Not on file    Number of children: Not on file    Years of education: Not on file    Highest education level: Not on file   Social Needs    Financial resource strain: Not on file    Food insecurity - worry: Not on file    Food insecurity - inability: Not on file   [employer] needs - medical: Not on file   [employer] needs - non-medical: Not on file   Occupational History    Not on file   Tobacco Use    Smoking status: Current Every Day Smoker    Smokeless tobacco: Never Used   Substance and Sexual Activity    Alcohol use: No     Frequency: Never    Drug use: No    Sexual activity: Not on file   Other Topics Concern    Not on file   Social History Narrative    Not on file       Family History:   History reviewed. No pertinent family history. Allergies: Allergies   Allergen Reactions    Pcn [Penicillins] Other (comments)         Home Medications:     (Not in a hospital admission)    Current Medications:     Current Facility-Administered Medications   Medication Dose Route Frequency    benztropine (COGENTIN) tablet 1 mg  1 mg Oral BID    risperiDONE (RisperDAL) tablet 6 mg  6 mg Oral QHS    topiramate (TOPAMAX) tablet 50 mg  50 mg Oral BID WITH MEALS    traZODone (DESYREL) tablet 100 mg  100 mg Oral QHS    OLANZapine (ZyPREXA) tablet 40 mg  40 mg Oral QPM    oseltamivir (TAMIFLU) capsule 75 mg  75 mg Oral BID    acetaminophen (TYLENOL) tablet 1,000 mg  1,000 mg Oral Q6H PRN    guaiFENesin-dextromethorphan (ROBITUSSIN DM) 100-10 mg/5 mL syrup 10 mL  10 mL Oral Q6H PRN     Current Outpatient Medications   Medication Sig Dispense    oseltamivir (TAMIFLU) 75 mg capsule Take 1 Cap by mouth two (2) times a day for 9 doses.  9 Cap       Review of Systems: 12 points ROS done though limited given is mental condition. Pertinent positives and negatives are as follows, ROS otherwise negative. Constitutional: +ve for fever, chills, neg diaphoresis and unexpected weight change. HENT: Negative for ear pain, congestion, +ve sore throat, neg rhinorrhea. Eyes: Negative for pain, redness and visual disturbance. Respiratory: negative for shortness of breath, chest tightness, wheezing. +ve cough,   Cardiovascular: Negative for chest pain, palpitations  Gastrointestinal: Negative for nausea, vomiting, abdominal pain or diarrhea  Genitourinary: Negative for dysuria   Musculoskeletal: Negative for back pain  Skin: Negative for ulcers, rash  Neurological: Negative for dizziness, syncope, light-headedness or headaches. Hematological:Negative for easy bruising or bleeding. Psychiatric/Behavioral: Hearing voices. Physical Exam:  Vitals  Temp (24hrs), Av.4 °F (36.9 °C), Min:98 °F (36.7 °C), Max:98.6 °F (37 °C)    Visit Vitals  /75   Pulse 72   Temp 98.6 °F (37 °C)   Resp 12   Ht 5' 7\" (1.702 m)   Wt 67.8 kg (149 lb 8 oz)   SpO2 100%   BMI 23.42 kg/m²       General: Fairly-developed, 62y.o. year-old, male, in no acute distress, wearing a mask  HEENT: Normocephalic, anicteric sclerae, Pupils equal, round reactive to light, no oropharyngeal lesions. No sinus tenderness. Neck: Supple, no lymphadenopathy, masses or thyromegaly  Chest: Symmetrical expansion  Lungs: Clear to auscultation bilaterally, no dullness  Heart: Regular rhythm, no murmurs, rubs or gallops, No JVD  Abdomen: Soft, non-tender,non distended, no organomegaly, BS+  Musculoskeletal: Normal strength/tone. No edema. No clubbing or cyanosis  CNS: AAOx3. Follows simple command. No NR  SKIN: No skin lesion or rash.  Dry, warm, intact          Labs: Results:   Chemistry Recent Labs     19   *      K 3.2*      CO2 26   BUN 14   CREA 1.19   CA 8.4*   AGAP 8   BUCR 12   AP 82 TP 7.1   ALB 3.2*   GLOB 3.9   AGRAT 0.8      CBC w/Diff Recent Labs     02/12/19 2020   WBC 5.7   RBC 4.00*   HGB 11.5*   HCT 35.2*      GRANS 43   LYMPH 35   EOS 1      Microbiology Recent Labs     02/12/19 2045 02/12/19 2020   CULT NO GROWTH AFTER 12 HOURS NO GROWTH AFTER 12 HOURS          Imaging-    Results from Hospital Encounter encounter on 02/12/19   XR CHEST PA LAT    Narrative EXAM:  PA AND LATERAL CHEST    INDICATION:  Cough and fever. COMPARISON:  None. TECHNIQUE:  PA and lateral views.    ________________________________    FINDINGS:    HEART AND MEDIASTINUM: Cardiac mediastinal silhouette appears within normal  limits. Normal caliber thoracic aorta. No central vascular congestion. LUNGS AND PLEURAL SPACES: Lungs are well aerated with no confluent airspace  opacity. No pleural effusion or pneumothorax. No pleural effusion or  pneumothorax. BONY THORAX AND SOFT TISSUES: No acute osseous abnormality. Multilevel thoracic  spondylosis. ________________________________      Impression IMPRESSION:    No acute cardiopulmonary disease. Preliminary report provided by on-call radiology resident. No results found for this or any previous visit.    ---------------------------------------------------------------------------------------------------------------  I have independently examined the patient and reviewed all lab studies and imgaing as well as review of nursing notes and physican notes from the past 24 hours. The plan of care has been discussed with the patient/relative and all questions are answered. Dragon medical dictation software was used for portions of this report. Unintended errors may occur.      Redd Carter MD  2/14/2019    [facility] AT THE [facility]  [phone]

## 2019-02-14 NOTE — ED NOTES
Note:  Assuming care of patient at beginning of shift 
 
6:08 AM 
Vicky RHODES MD, assumed care of patient at the beginning of my shift. 
 
6:08 AM 
 
Date: 2/12/2019 Patient Name: Maria Eugenia Born History of Presenting Illness Chief Complaint Patient presents with  Mental Health Problem Nursing notes regarding the HPI and triage nursing notes were reviewed. Prior medical records were reviewed. Current Facility-Administered Medications Medication Dose Route Frequency Provider Last Rate Last Dose  benztropine (COGENTIN) tablet 1 mg  1 mg Oral BID Luaksz So MD   1 mg at 02/13/19 1854  risperiDONE (RisperDAL) tablet 6 mg  6 mg Oral QHS Lukasz So MD   Stopped at 02/13/19 2200  topiramate (TOPAMAX) tablet 50 mg  50 mg Oral BID WITH MEALS Lukasz So MD   50 mg at 02/13/19 1853  traZODone (DESYREL) tablet 100 mg  100 mg Oral QHS Lukasz So MD   100 mg at 02/13/19 2208  OLANZapine (ZyPREXA) tablet 40 mg  40 mg Oral QPM Lukasz So MD   40 mg at 02/13/19 2050  oseltamivir (TAMIFLU) capsule 75 mg  75 mg Oral BID Lukasz So MD   75 mg at 02/13/19 1854  acetaminophen (TYLENOL) tablet 1,000 mg  1,000 mg Oral Q6H PRN Lukasz So MD      
 guaiFENesin-dextromethorphan [facility] DM) 100-10 mg/5 mL syrup 10 mL  10 mL Oral Q6H PRN Lukasz So MD   10 mL at 02/13/19 1034 Current Outpatient Medications Medication Sig Dispense Refill  oseltamivir (TAMIFLU) 75 mg capsule Take 1 Cap by mouth two (2) times a day for 9 doses. 9 Cap 0 Past History Past Medical History: 
History reviewed. No pertinent past medical history. Past Surgical History: 
History reviewed. No pertinent surgical history. Family History: 
History reviewed. No pertinent family history. Social History: 
Social History Tobacco Use  Smoking status: Current Every Day Smoker  Smokeless tobacco: Never Used Substance Use Topics  Alcohol use: No  
  Frequency: Never  Drug use: No  
 
 
Allergies: Allergies Allergen Reactions  Pcn [Penicillins] Other (comments) Patient's primary care provider (as noted in EPIC):  None Abnormal lab results from this emergency department encounter: 
Labs Reviewed INFLUENZA A & B AG (RAPID TEST) - Abnormal; Notable for the following components:  
    Result Value Influenza A Antigen POSITIVE (*) All other components within normal limits CBC WITH AUTOMATED DIFF - Abnormal; Notable for the following components: RBC 4.00 (*) HGB 11.5 (*) HCT 35.2 (*) MONOCYTES 21 (*) All other components within normal limits METABOLIC PANEL, COMPREHENSIVE - Abnormal; Notable for the following components:  
 Potassium 3.2 (*) Glucose 116 (*) Calcium 8.4 (*) AST (SGOT) 115 (*) Albumin 3.2 (*) All other components within normal limits VALPROIC ACID - Abnormal; Notable for the following components:  
 Valproic acid 4 (*) All other components within normal limits CULTURE, BLOOD CULTURE, BLOOD  
ETHYL ALCOHOL  
DRUG SCREEN, URINE  
URINALYSIS W/ RFLX MICROSCOPIC  
POC LACTIC ACID Lab values for this patient within approximately the last 12 hours: 
No results found for this or any previous visit (from the past 12 hour(s)). Radiologist and cardiologist interpretations if available at time of this note: 
Radiology results: No results found. Medication(s) ordered for patient during this emergency visit encounter: 
Medications  
oseltamivir (TAMIFLU) capsule 75 mg (75 mg Oral Given 2/13/19 1854) acetaminophen (TYLENOL) tablet 1,000 mg (not administered) guaiFENesin-dextromethorphan (ROBITUSSIN DM) 100-10 mg/5 mL syrup 10 mL (10 mL Oral Given 2/13/19 1034)  
benztropine (COGENTIN) tablet 1 mg (1 mg Oral Given 2/13/19 1854) risperiDONE (RisperDAL) tablet 6 mg (0 mg Oral Held 2/13/19 2200) topiramate (TOPAMAX) tablet 50 mg (50 mg Oral Given 2/13/19 1853) traZODone (DESYREL) tablet 100 mg (100 mg Oral Given 2/13/19 2208) OLANZapine (ZyPREXA) tablet 40 mg (40 mg Oral Given 2/13/19 2050)  
acetaminophen (TYLENOL) tablet 1,000 mg (1,000 mg Oral Given 2/12/19 2027)  
sodium chloride 0.9 % bolus infusion 1,000 mL (0 mL IntraVENous IV Completed 2/12/19 2128) guaiFENesin-dextromethorphan (ROBITUSSIN DM) 100-10 mg/5 mL syrup 10 mL (10 mL Oral Given 2/12/19 2119) potassium chloride (K-DUR, KLOR-CON) SR tablet 40 mEq (40 mEq Oral Given 2/12/19 2225)  
magnesium sulfate 2 g/50 ml IVPB (premix or compounded) (0 g IntraVENous IV Completed 2/12/19 2240) Pt care assumed from Dr. Savanah Ruggiero , ED provider. Pt complaint(s), current treatment plan, progression and available diagnostic results have been discussed thoroughly. Rounding occurred: no Intended Disposition: Transfer Pending diagnostic reports and/or labs (please list): Infectious disease consult for clearance given patient has flu with goal of medical clearance to allow transfer to a behavioral medicine facility. 2:12 PM 
ID physician consult done. Can be transferred to behavioral medicine unit if they have droplet precautions. Case management consulted. 4:12 PM 
Case management would like patient reevaluated by telepsychiatry to see if he no longer needs behavioral medicine admission. Signout Note Pt care transferred to Dr. Savanah Ruggiero  ,ED provider. History of patient complaint(s), available diagnostic reports and current treatment plan has been discussed thoroughly. Bedside rounding on patient occured : no . Intended disposition of patient : TBD. Pending diagnostics reports and/or labs (please list): Telepsyh reevaluation of patient to determine if still needs inpatient behavioral medicine admission. Coding Diagnoses Clinical Impression: 1. Hallucinations 2. Influenza A Disposition Disposition:  Transfer. Jae Arciniega M.D. [facility] Board Certified Emergency Physician

## 2019-02-14 NOTE — ED NOTES
Patient report to Kecia Kessler.MUMTAZ. Tori updated on plan of care to update [facility] on what ID consult says

## 2019-02-14 NOTE — ED NOTES
Pt able to eat dinner and is comfortable and cooperative. Sitter at bedside Urinal within reach Purposeful rounding completed: 
 
Side rails up x 2:  YES Bed in low position and wheels locked: YES Call bell within reach: YES Comfort addressed: YES Toileting needs addressed: YES Plan of care reviewed/updated with patient and or family members: YES 
IV site assessed: YES Pain assessed and addressed: YES, 0

## 2019-02-14 NOTE — ED NOTES
Assumed care of patient, report taken from [place]. Sitter at bedside and droplet precautions in place due to flu positive.

## 2019-02-14 NOTE — ED NOTES
Assumed care of patient, report taken from [address] [facility]. Dr. Ethan Bolaños notified of my conversation with [facility]. New tele psych consult will be ordered

## 2019-02-14 NOTE — ED NOTES
Verbal shift change report given to Jack Velasquez RN (oncoming nurse) by Dotty Vega (offgoing nurse). Report included the following information SBAR, ED Summary and MAR.

## 2019-02-14 NOTE — ED NOTES
Spoke with Erica from [facility] who states all CSU units are declining patient due to positive flu. When asked what clearance patient needs to be accepted Erica stated he was unsure. Erica was asked at this time is afebrile and receiving tamiflu for 48 hours and an ID consult would be enough Erica said to try it. Dr. Isabel Burris notified. Patient afebrile at this time and has been receiving tamiflu. Patient continues to wear mask and be on droplet precautions.  Consult placed to ID

## 2019-02-15 VITALS
TEMPERATURE: 98 F | OXYGEN SATURATION: 100 % | HEIGHT: 67 IN | SYSTOLIC BLOOD PRESSURE: 112 MMHG | BODY MASS INDEX: 23.46 KG/M2 | RESPIRATION RATE: 16 BRPM | WEIGHT: 149.5 LBS | HEART RATE: 71 BPM | DIASTOLIC BLOOD PRESSURE: 74 MMHG

## 2019-02-15 PROCEDURE — 74011250637 HC RX REV CODE- 250/637: Performed by: EMERGENCY MEDICINE

## 2019-02-15 RX ORDER — RISPERIDONE 3 MG/1
6 TABLET, FILM COATED ORAL
Qty: 28 TAB | Refills: 0 | Status: SHIPPED | OUTPATIENT
Start: 2019-02-15

## 2019-02-15 RX ORDER — OSELTAMIVIR PHOSPHATE 75 MG/1
75 CAPSULE ORAL 2 TIMES DAILY
Qty: 4 CAP | Refills: 0 | Status: SHIPPED | OUTPATIENT
Start: 2019-02-15 | End: 2019-02-17

## 2019-02-15 RX ORDER — BENZTROPINE MESYLATE 1 MG/1
1 TABLET ORAL 2 TIMES DAILY
Qty: 28 TAB | Refills: 0 | Status: SHIPPED | OUTPATIENT
Start: 2019-02-15

## 2019-02-15 RX ORDER — TRAZODONE HYDROCHLORIDE 100 MG/1
100 TABLET ORAL
Qty: 14 TAB | Refills: 0 | Status: SHIPPED | OUTPATIENT
Start: 2019-02-15

## 2019-02-15 RX ORDER — TOPIRAMATE 50 MG/1
50 TABLET, FILM COATED ORAL 2 TIMES DAILY WITH MEALS
Qty: 28 TAB | Refills: 0 | Status: SHIPPED | OUTPATIENT
Start: 2019-02-15

## 2019-02-15 RX ORDER — OLANZAPINE 20 MG/1
40 TABLET ORAL EVERY EVENING
Qty: 28 TAB | Refills: 0 | Status: SHIPPED | OUTPATIENT
Start: 2019-02-15

## 2019-02-15 RX ADMIN — TRAZODONE HYDROCHLORIDE 100 MG: 50 TABLET ORAL at 01:32

## 2019-02-15 RX ADMIN — RISPERIDONE 6 MG: 2 TABLET ORAL at 01:31

## 2019-02-15 NOTE — ED NOTES
Received patient report at this time. Assuming care of patient at this time from [address] RN.

## 2019-02-15 NOTE — ED NOTES
Seferino Grimes from [facility] called back at this time and states patients outpatient appointment with Dr. Skyla Cardona is 3/18/19. Patient needs an outpatient appointment sooner for medication compliance. Case management to evaluate in morning

## 2019-02-15 NOTE — ED NOTES
Verbal shift change report given to Rosendo Dumont (oncoming nurse) by Estefania Cee (offgoing nurse). Report included the following information SBAR, ED Summary and MAR.

## 2019-02-15 NOTE — ED NOTES
Vitals: 
Patient Vitals for the past 12 hrs: 
 Temp Pulse Resp BP SpO2  
02/15/19 0019 97.4 °F (36.3 °C) 77 16 111/71 99 % 02/14/19 2109 98.1 °F (36.7 °C) 81 16 130/84 100 % 02/14/19 2022 98.5 °F (36.9 °C)      
02/14/19 1719 98.4 °F (36.9 °C) 70 16 109/76 100 % Medications ordered:  
Medications  
oseltamivir (TAMIFLU) capsule 75 mg (75 mg Oral Given 2/14/19 2017)  
acetaminophen (TYLENOL) tablet 1,000 mg (not administered) guaiFENesin-dextromethorphan (ROBITUSSIN DM) 100-10 mg/5 mL syrup 10 mL (10 mL Oral Given 2/13/19 1034)  
benztropine (COGENTIN) tablet 1 mg (1 mg Oral Given 2/14/19 2017)  
risperiDONE (RisperDAL) tablet 6 mg (6 mg Oral Given 2/15/19 0131) topiramate (TOPAMAX) tablet 50 mg (50 mg Oral Given 2/14/19 2017) traZODone (DESYREL) tablet 100 mg (100 mg Oral Given 2/15/19 0132) OLANZapine (ZyPREXA) tablet 40 mg (40 mg Oral Given 2/14/19 2104) acetaminophen (TYLENOL) tablet 1,000 mg (1,000 mg Oral Given 2/12/19 2027)  
sodium chloride 0.9 % bolus infusion 1,000 mL (0 mL IntraVENous IV Completed 2/12/19 2128) guaiFENesin-dextromethorphan (ROBITUSSIN DM) 100-10 mg/5 mL syrup 10 mL (10 mL Oral Given 2/12/19 2119) potassium chloride (K-DUR, KLOR-CON) SR tablet 40 mEq (40 mEq Oral Given 2/12/19 2225)  
magnesium sulfate 2 g/50 ml IVPB (premix or compounded) (0 g IntraVENous IV Completed 2/12/19 2240) Lab findings: 
No results found for this or any previous visit (from the past 12 hour(s)). EKG interpretation by ED Physician: X-Ray, CT or other radiology findings or impressions: 
XR CHEST PA LAT Final Result IMPRESSION:  
  
No acute cardiopulmonary disease. Preliminary report provided by on-call radiology resident. Progress notes, Consult notes or additional Procedure notes:  
T/o from dr Luther Hallman pending repeat psych consult which was not placed per nurse.  I have placed another consult for evaluation for possible d/c home as pt unable to be placed in mental health facility due to influenza. Have reviewed ID consult Reviewed repeat psych consult with rec for outpt treatment. Charge nurse has contacted [facility] who will provide information for his . Will keep here overnight to ensure smooth transition home and ability to get meds. Will write rx for his meds for 2 week supply T/o dr Prince Brennan to f/u [facility] f/u Reevaluation of patient:  
adela Disposition: 
Diagnosis: 1. Hallucinations 2. Influenza A Disposition: home Follow-up Information Follow up With Specialties Details Why Contact Info [employer]  Schedule an appointment as soon as possible for a visit  [address]  
[phone] [facility] EMERGENCY DEPT Emergency Medicine  If symptoms worsen [address] 
[phone] Medication List  
  
START taking these medications   
benztropine 1 mg tablet Commonly known as:  COGENTIN Take 1 Tab by mouth two (2) times a day. OLANZapine 20 mg tablet Commonly known as:  ZyPREXA Take 2 Tabs by mouth every evening. oseltamivir 75 mg capsule Commonly known as:  TAMIFLU Take 1 Cap by mouth two (2) times a day for 4 doses. risperiDONE 3 mg tablet Commonly known as:  RisperDAL Take 2 Tabs by mouth nightly. topiramate 50 mg tablet Commonly known as:  TOPAMAX Take 1 Tab by mouth two (2) times daily (with meals). traZODone 100 mg tablet Commonly known as:  Prieto Levon Take 1 Tab by mouth nightly. Where to Get Your Medications Information about where to get these medications is not yet available Ask your nurse or doctor about these medications · benztropine 1 mg tablet · OLANZapine 20 mg tablet · oseltamivir 75 mg capsule · risperiDONE 3 mg tablet · topiramate 50 mg tablet · traZODone 100 mg tablet

## 2019-02-15 NOTE — DISCHARGE INSTRUCTIONS
Patient Education        Influenza (Flu): Care Instructions  Your Care Instructions    Influenza (flu) is an infection in the lungs and breathing passages. It is caused by the influenza virus. There are different strains, or types, of the flu virus from year to year. Unlike the common cold, the flu comes on suddenly and the symptoms, such as a cough, congestion, fever, chills, fatigue, aches, and pains, are more severe. These symptoms may last up to 10 days. Although the flu can make you feel very sick, it usually doesn't cause serious health problems. Home treatment is usually all you need for flu symptoms. But your doctor may prescribe antiviral medicine to prevent other health problems, such as pneumonia, from developing. Older people and those who have a long-term health condition, such as lung disease, are most at risk for having pneumonia or other health problems. Follow-up care is a key part of your treatment and safety. Be sure to make and go to all appointments, and call your doctor if you are having problems. It's also a good idea to know your test results and keep a list of the medicines you take. How can you care for yourself at home? · Get plenty of rest.  · Drink plenty of fluids, enough so that your urine is light yellow or clear like water. If you have kidney, heart, or liver disease and have to limit fluids, talk with your doctor before you increase the amount of fluids you drink. · Take an over-the-counter pain medicine if needed, such as acetaminophen (Tylenol), ibuprofen (Advil, Motrin), or naproxen (Aleve), to relieve fever, headache, and muscle aches. Read and follow all instructions on the label. No one younger than 20 should take aspirin. It has been linked to Reye syndrome, a serious illness. · Do not smoke. Smoking can make the flu worse. If you need help quitting, talk to your doctor about stop-smoking programs and medicines.  These can increase your chances of quitting for good.  · Breathe moist air from a hot shower or from a sink filled with hot water to help clear a stuffy nose. · Before you use cough and cold medicines, check the label. These medicines may not be safe for young children or for people with certain health problems. · If the skin around your nose and lips becomes sore, put some petroleum jelly on the area. · To ease coughing:  ? Drink fluids to soothe a scratchy throat. ? Suck on cough drops or plain hard candy. ? Take an over-the-counter cough medicine that contains dextromethorphan to help you get some sleep. Read and follow all instructions on the label. ? Raise your head at night with an extra pillow. This may help you rest if coughing keeps you awake. · Take any prescribed medicine exactly as directed. Call your doctor if you think you are having a problem with your medicine. To avoid spreading the flu  · Wash your hands regularly, and keep your hands away from your face. · Stay home from school, work, and other public places until you are feeling better and your fever has been gone for at least 24 hours. The fever needs to have gone away on its own without the help of medicine. · Ask people living with you to talk to their doctors about preventing the flu. They may get antiviral medicine to keep from getting the flu from you. · To prevent the flu in the future, get a flu vaccine every fall. Encourage people living with you to get the vaccine. · Cover your mouth when you cough or sneeze. When should you call for help? Call 911 anytime you think you may need emergency care.  For example, call if:    · You have severe trouble breathing.    Call your doctor now or seek immediate medical care if:    · You have new or worse trouble breathing.     · You seem to be getting much sicker.     · You feel very sleepy or confused.     · You have a new or higher fever.     · You get a new rash.    Watch closely for changes in your health, and be sure to contact your doctor if:    · You begin to get better and then get worse.     · You are not getting better after 1 week. Where can you learn more? Go to http://noam-rere.info/. Enter I308 in the search box to learn more about \"Influenza (Flu): Care Instructions. \"  Current as of: September 5, 2018  Content Version: 11.9  © 6083-1480 [employer]. Care instructions adapted under license by [employer] (which disclaims liability or warranty for this information). If you have questions about a medical condition or this instruction, always ask your healthcare professional. [place] [address] any warranty or liability for your use of this information.
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
## 2019-02-15 NOTE — PROGRESS NOTES
Consult noted requesting an OP psychiatry appointment within 2 weeks. Call placed to pt's , Sheri Fabian ([phone]) and requested assistance with moving pt's appointment to an earlier date. Informed by Ms. Abdirashid Fried that I will need to call [phone] and request to be transferred to the [facility] clinic in order to make any appt changes. Call placed to the clinic and informed that the 3/18/19 appt is the earliest appt date at this time, and that the pt's provider is out of the office until March. Informed that pt can come in Mon-Fri 8:30am- 4:00pm as a walk-in. Dieter Tolliver RN, BSN, ACM Outcomes Manager 
[phone] (phone)

## 2019-02-15 NOTE — ED NOTES
Spoke with Sherlyn Thurston from [facility] at this time, asking if patient would be able to have follow up with [facility] if discharged home. Per adeel she will get the case workers information for us.

## 2019-02-15 NOTE — ED NOTES
Patient discharged to home with sister, patient to follow up at walk in clinic before mdication runs out, patient voices understanding, VSS

## 2019-02-15 NOTE — ED NOTES
Nicolasa stevenson from [facility] called back at this time and stated patients doctor. Is Dr. Rebecca Odonnell but unable to obtain information on when next appointment is and his  is Juan Antonio Rosa who can be reached at [phone]. Dr. Bandar iTneo notified and our case management needs to call patients  to ensure he has proper follow up prior to discharge this morning.

## 2019-02-15 NOTE — ED NOTES
6:09 AM :Pt care assumed from Dr. Gt Frost, ED provider. Pt complaint(s), current treatment plan, progression and available diagnostic results have been discussed thoroughly. Rounding occurred: N/A Intended Disposition: Home Pending diagnostic reports and/or labs (please list): Pending discharge when awake Scribe Attestation Kvng Portillo acting as a scribe for and in the presence of Chanel Miller MD     
February 15, 2019 at 6:09 AM 
    
Provider Attestation:     
I personally performed the services described in the documentation, reviewed the documentation, as recorded by the scribe in my presence, and it accurately and completely records my words and actions.  February 15, 2019 at 6:09 AM - Chanel Miller MD

## 2019-02-15 NOTE — ED NOTES
Patient cleared to go home by tele-psych. Will consult [facility] at this time. Patient will be discharge in morning

## 2019-02-18 LAB
BACTERIA SPEC CULT: NORMAL
BACTERIA SPEC CULT: NORMAL
SERVICE CMNT-IMP: NORMAL
SERVICE CMNT-IMP: NORMAL

## 2020-05-24 ENCOUNTER — HOSPITAL ENCOUNTER (EMERGENCY)
Age: 60
Discharge: HOME | End: 2020-05-24
Attending: EMERGENCY MEDICINE
Payer: MEDICAID

## 2020-05-24 VITALS
HEART RATE: 93 BPM | RESPIRATION RATE: 17 BRPM | WEIGHT: 153 LBS | BODY MASS INDEX: 23.26 KG/M2 | OXYGEN SATURATION: 100 % | SYSTOLIC BLOOD PRESSURE: 133 MMHG | TEMPERATURE: 98.3 F | DIASTOLIC BLOOD PRESSURE: 70 MMHG

## 2020-05-24 DIAGNOSIS — K08.89 DENTALGIA: Primary | ICD-10-CM

## 2020-05-24 PROCEDURE — 74011250637 HC RX REV CODE- 250/637: Performed by: PHYSICIAN ASSISTANT

## 2020-05-24 PROCEDURE — 99283 EMERGENCY DEPT VISIT LOW MDM: CPT

## 2020-05-24 RX ORDER — IBUPROFEN 600 MG/1
600 TABLET ORAL
Status: COMPLETED | OUTPATIENT
Start: 2020-05-24 | End: 2020-05-24

## 2020-05-24 RX ORDER — IBUPROFEN 600 MG/1
600 TABLET ORAL
Qty: 20 TAB | Refills: 0 | Status: SHIPPED | OUTPATIENT
Start: 2020-05-24 | End: 2020-05-25

## 2020-05-24 RX ORDER — CLINDAMYCIN HYDROCHLORIDE 150 MG/1
450 CAPSULE ORAL 3 TIMES DAILY
Qty: 63 CAP | Refills: 0 | Status: SHIPPED | OUTPATIENT
Start: 2020-05-24 | End: 2020-05-25

## 2020-05-24 RX ORDER — CLINDAMYCIN HYDROCHLORIDE 150 MG/1
450 CAPSULE ORAL
Status: COMPLETED | OUTPATIENT
Start: 2020-05-24 | End: 2020-05-24

## 2020-05-24 RX ADMIN — CLINDAMYCIN HYDROCHLORIDE 450 MG: 150 CAPSULE ORAL at 21:46

## 2020-05-24 RX ADMIN — IBUPROFEN 600 MG: 600 TABLET ORAL at 21:46

## 2020-05-25 RX ORDER — IBUPROFEN 600 MG/1
600 TABLET ORAL
Qty: 20 TAB | Refills: 0 | Status: SHIPPED | OUTPATIENT
Start: 2020-05-25

## 2020-05-25 RX ORDER — CLINDAMYCIN HYDROCHLORIDE 150 MG/1
450 CAPSULE ORAL 3 TIMES DAILY
Qty: 63 CAP | Refills: 0 | Status: SHIPPED | OUTPATIENT
Start: 2020-05-25 | End: 2020-06-01
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
## 2020-05-25 NOTE — CALL BACK NOTE
Patient's caregiver called; pharmacy for initial prescription destination is closed today on the holiday and is requesting to have meds transferred to [facility] which I did. - sukhjinder chan

## 2020-05-30 NOTE — ED PROVIDER NOTES
EMERGENCY DEPARTMENT HISTORY AND PHYSICAL EXAM    Date: 5/24/2020  Patient Name: Sydney Angulo    History of Presenting Illness     Chief Complaint   Patient presents with    Dental Pain         History Provided By: Patient        Additional History (Context): Sydney Angulo is a 61 y.o. male with Previous history of schizophrenia who presents with complaint of moderate left lower second molar pain x3 days. No medications taken for pain. Patient states he has not been to a dentist in [place] years\". Patient denies fever, myalgias or chills; no known ill contacts. PCP: Yunior Yin MD    Current Outpatient Medications   Medication Sig Dispense Refill    clindamycin (CLEOCIN) 150 mg capsule Take 3 Caps by mouth three (3) times daily for 7 days. 63 Cap 0    ibuprofen (MOTRIN) 600 mg tablet Take 1 Tab by mouth every eight (8) hours as needed for Pain. 20 Tab 0    lithium carbonate 300 mg tablet Take 300 mg by mouth two (2) times a day. Takes 300 mg in the morning and 600 mg in the evening.  benztropine (COGENTIN) 1 mg tablet Take 1 Tab by mouth two (2) times a day. 28 Tab 0    risperiDONE (RISPERDAL) 3 mg tablet Take 2 Tabs by mouth nightly. 28 Tab 0    topiramate (TOPAMAX) 50 mg tablet Take 1 Tab by mouth two (2) times daily (with meals). 28 Tab 0    traZODone (DESYREL) 100 mg tablet Take 1 Tab by mouth nightly. 14 Tab 0    OLANZapine (ZYPREXA) 20 mg tablet Take 2 Tabs by mouth every evening. 28 Tab 0       Past History     Past Medical History:  Past Medical History:   Diagnosis Date    Psychiatric disorder     schizophrenia, depression       Past Surgical History:  Past Surgical History:   Procedure Laterality Date    HX ORTHOPAEDIC      left arm surgery    HX ORTHOPAEDIC      left leg surgery       Family History:  History reviewed. No pertinent family history.     Social History:  Social History     Tobacco Use    Smoking status: Current Every Day Smoker    Smokeless tobacco: Never Used   Substance Use Topics    Alcohol use: No     Frequency: Never    Drug use: No       Allergies: Allergies   Allergen Reactions    Pcn [Penicillins] Other (comments)         Review of Systems   Review of Systems  Review of Systems   Constitutional: Negative for fatigue and fever. HENT: Negative for congestion. Respiratory: Negative for cough and shortness of breath. Cardiovascular: Negative for chest pain. Gastrointestinal: Negative for abdominal pain, diarrhea, nausea and vomiting. Genitourinary: Negative for difficulty urinating and dysuria. Musculoskeletal: Negative joint pain, joint swelling, recent injury. Skin: Negative for wound. Neurological: Negative for dizziness and headaches. All other systems reviewed and are negative. All Other Systems Negative  Physical Exam     Vitals:    05/24/20 1944   BP: 133/70   Pulse: 93   Resp: 17   Temp: 98.3 °F (36.8 °C)   SpO2: 100%   Weight: 69.4 kg (153 lb)     Physical Exam     Constitutional: Pt is oriented to person, place, and time. Pt appears well-developed and well-nourished. HENT:   Head: Normocephalic and atraumatic. Mouth/Throat: Oropharynx is clear and moist.  Multiple teeth with chronic erosion and caries are present. The left lower second molar has a large marco with chronic erosion as well. No gingival edema. No abscess. Eyes: Pupils are equal, round, and reactive to light. Neck: Normal range of motion. Neck supple. No tracheal deviation present. No thyromegaly present. Cardiovascular: Normal rate, regular rhythm and normal heart sounds. No murmur heard. Pulmonary/Chest: Effort normal and breath sounds normal. No respiratory distress. No wheezes or rales. Neurological: Pt is alert and oriented to person, place, and time   Skin: Skin is warm and dry.    Psychiatric: Pt has a normal mood and affect;  behavior is normal. Judgment and thought content normal.           Diagnostic Study Results     Labs -   No results found for this or any previous visit (from the past 12 hour(s)). Radiologic Studies -   No orders to display     CT Results  (Last 48 hours)    None        CXR Results  (Last 48 hours)    None            Medical Decision Making   I am the first provider for this patient. I reviewed the vital signs, available nursing notes, past medical history, past surgical history, family history and social history. Vital Signs-Reviewed the patient's vital signs. Comparison:    Records Reviewed: Nursing Notes and Old Medical Records    Procedures:  Procedures    Provider Notes (Medical Decision Making):   Patient needs dental care he does not have dental insurance provided [facility] dental clinic to this patient. Patient placed on clindamycin secondary to penicillin allergy. MED RECONCILIATION:  No current facility-administered medications for this encounter. Current Outpatient Medications   Medication Sig    clindamycin (CLEOCIN) 150 mg capsule Take 3 Caps by mouth three (3) times daily for 7 days.  ibuprofen (MOTRIN) 600 mg tablet Take 1 Tab by mouth every eight (8) hours as needed for Pain.  lithium carbonate 300 mg tablet Take 300 mg by mouth two (2) times a day. Takes 300 mg in the morning and 600 mg in the evening.  benztropine (COGENTIN) 1 mg tablet Take 1 Tab by mouth two (2) times a day.  risperiDONE (RISPERDAL) 3 mg tablet Take 2 Tabs by mouth nightly.  topiramate (TOPAMAX) 50 mg tablet Take 1 Tab by mouth two (2) times daily (with meals).  traZODone (DESYREL) 100 mg tablet Take 1 Tab by mouth nightly.  OLANZapine (ZYPREXA) 20 mg tablet Take 2 Tabs by mouth every evening. Disposition:  Home    DISCHARGE NOTE:     Patient seen, examined and discharged from triage. Patient has no other complaints, changes, or physical findings. Care plan outlined and precautions discussed. Results of exam were reviewed with the patient.  All medications were reviewed with the patient; will d/c home with follow up instructions. All of pt's questions and concerns were addressed. Patient was instructed and agrees to follow up with primary care, as well as to return to the ED upon further deterioration. Patient is ready to go home. Follow-up Information     Follow up With Specialties Details Why Contact Info    Purnima Jones MD [facility]   [address] [id number]  [phone]      [address] [address]) [address] [phone]    [facility] EMERGENCY DEPT Emergency Medicine  If symptoms worsen [address] [facility] [address]  [phone]          Discharge Medication List as of 5/24/2020  9:40 PM      START taking these medications    Details   clindamycin (CLEOCIN) 150 mg capsule Take 3 Caps by mouth three (3) times daily for 7 days. , Normal, Disp-63 Cap, R-0      ibuprofen (MOTRIN) 600 mg tablet Take 1 Tab by mouth every eight (8) hours as needed for Pain., Normal, Disp-20 Tab, R-0         CONTINUE these medications which have NOT CHANGED    Details   lithium carbonate 300 mg tablet Take 300 mg by mouth two (2) times a day. Takes 300 mg in the morning and 600 mg in the evening., Historical Med      benztropine (COGENTIN) 1 mg tablet Take 1 Tab by mouth two (2) times a day., Print, Disp-28 Tab, R-0      risperiDONE (RISPERDAL) 3 mg tablet Take 2 Tabs by mouth nightly. , Print, Disp-28 Tab, R-0      topiramate (TOPAMAX) 50 mg tablet Take 1 Tab by mouth two (2) times daily (with meals). , Print, Disp-28 Tab, R-0      traZODone (DESYREL) 100 mg tablet Take 1 Tab by mouth nightly. , Print, Disp-14 Tab, R-0      OLANZapine (ZYPREXA) 20 mg tablet Take 2 Tabs by mouth every evening., Print, Disp-28 Tab, R-0                 Diagnosis     Clinical Impression:   1.  Diallo Dawson

## 2021-01-06 ENCOUNTER — HOSPITAL ENCOUNTER (EMERGENCY)
Age: 61
Discharge: HOME | End: 2021-01-06
Attending: EMERGENCY MEDICINE
Payer: MEDICAID

## 2021-01-06 VITALS
OXYGEN SATURATION: 100 % | RESPIRATION RATE: 18 BRPM | DIASTOLIC BLOOD PRESSURE: 69 MMHG | TEMPERATURE: 98.6 F | SYSTOLIC BLOOD PRESSURE: 116 MMHG | HEART RATE: 86 BPM

## 2021-01-06 DIAGNOSIS — R44.0 AUDITORY HALLUCINATIONS: Primary | ICD-10-CM

## 2021-01-06 LAB
ALBUMIN SERPL-MCNC: 3.4 G/DL (ref 3.4–5)
ALBUMIN/GLOB SERPL: 0.9 {RATIO} (ref 0.8–1.7)
ALP SERPL-CCNC: 82 U/L (ref 45–117)
ALT SERPL-CCNC: 23 U/L (ref 16–61)
AMPHET UR QL SCN: NEGATIVE
ANION GAP SERPL CALC-SCNC: 3 MMOL/L (ref 3–18)
APPEARANCE UR: CLEAR
AST SERPL-CCNC: 16 U/L (ref 10–38)
BARBITURATES UR QL SCN: NEGATIVE
BASOPHILS # BLD: 0 K/UL (ref 0–0.1)
BASOPHILS NFR BLD: 0 % (ref 0–2)
BENZODIAZ UR QL: NEGATIVE
BILIRUB SERPL-MCNC: 0.3 MG/DL (ref 0.2–1)
BILIRUB UR QL: NEGATIVE
BUN SERPL-MCNC: 12 MG/DL (ref 7–18)
BUN/CREAT SERPL: 8 (ref 12–20)
CALCIUM SERPL-MCNC: 9.2 MG/DL (ref 8.5–10.1)
CANNABINOIDS UR QL SCN: NEGATIVE
CHLORIDE SERPL-SCNC: 109 MMOL/L (ref 100–111)
CO2 SERPL-SCNC: 29 MMOL/L (ref 21–32)
COCAINE UR QL SCN: NEGATIVE
COLOR UR: YELLOW
CREAT SERPL-MCNC: 1.45 MG/DL (ref 0.6–1.3)
DIFFERENTIAL METHOD BLD: ABNORMAL
EOSINOPHIL # BLD: 0.1 K/UL (ref 0–0.4)
EOSINOPHIL NFR BLD: 2 % (ref 0–5)
ERYTHROCYTE [DISTWIDTH] IN BLOOD BY AUTOMATED COUNT: 14.3 % (ref 11.6–14.5)
ETHANOL SERPL-MCNC: <3 MG/DL (ref 0–3)
GLOBULIN SER CALC-MCNC: 3.9 G/DL (ref 2–4)
GLUCOSE SERPL-MCNC: 88 MG/DL (ref 74–99)
GLUCOSE UR STRIP.AUTO-MCNC: NEGATIVE MG/DL
HCT VFR BLD AUTO: 34.8 % (ref 36–48)
HDSCOM,HDSCOM: NORMAL
HGB BLD-MCNC: 11.5 G/DL (ref 13–16)
HGB UR QL STRIP: NEGATIVE
KETONES UR QL STRIP.AUTO: NEGATIVE MG/DL
LEUKOCYTE ESTERASE UR QL STRIP.AUTO: NEGATIVE
LYMPHOCYTES # BLD: 1.6 K/UL (ref 0.9–3.6)
LYMPHOCYTES NFR BLD: 21 % (ref 21–52)
MCH RBC QN AUTO: 30.1 PG (ref 24–34)
MCHC RBC AUTO-ENTMCNC: 33 G/DL (ref 31–37)
MCV RBC AUTO: 91.1 FL (ref 74–97)
METHADONE UR QL: NEGATIVE
MONOCYTES # BLD: 0.8 K/UL (ref 0.05–1.2)
MONOCYTES NFR BLD: 11 % (ref 3–10)
NEUTS SEG # BLD: 5 K/UL (ref 1.8–8)
NEUTS SEG NFR BLD: 66 % (ref 40–73)
NITRITE UR QL STRIP.AUTO: NEGATIVE
OPIATES UR QL: NEGATIVE
PCP UR QL: NEGATIVE
PH UR STRIP: 6.5 [PH] (ref 5–8)
PLATELET # BLD AUTO: 147 K/UL (ref 135–420)
PMV BLD AUTO: 11 FL (ref 9.2–11.8)
POTASSIUM SERPL-SCNC: 4 MMOL/L (ref 3.5–5.5)
PROT SERPL-MCNC: 7.3 G/DL (ref 6.4–8.2)
PROT UR STRIP-MCNC: NEGATIVE MG/DL
RBC # BLD AUTO: 3.82 M/UL (ref 4.7–5.5)
SODIUM SERPL-SCNC: 141 MMOL/L (ref 136–145)
SP GR UR REFRACTOMETRY: <1.005 (ref 1–1.03)
UROBILINOGEN UR QL STRIP.AUTO: 0.2 EU/DL (ref 0.2–1)
WBC # BLD AUTO: 7.5 K/UL (ref 4.6–13.2)

## 2021-01-06 PROCEDURE — 80307 DRUG TEST PRSMV CHEM ANLYZR: CPT

## 2021-01-06 PROCEDURE — 82077 ASSAY SPEC XCP UR&BREATH IA: CPT

## 2021-01-06 PROCEDURE — 80053 COMPREHEN METABOLIC PANEL: CPT

## 2021-01-06 PROCEDURE — 81003 URINALYSIS AUTO W/O SCOPE: CPT

## 2021-01-06 PROCEDURE — 99282 EMERGENCY DEPT VISIT SF MDM: CPT

## 2021-01-06 PROCEDURE — 85025 COMPLETE CBC W/AUTO DIFF WBC: CPT

## 2021-01-06 NOTE — ED PROVIDER NOTES
EMERGENCY DEPARTMENT HISTORY AND PHYSICAL EXAM    4:20 PM      Date: 1/6/2021  Patient Name: Mazin Briones    History of Presenting Illness     Chief Complaint   Patient presents with    Mental Health Problem       History Provided By: Patient    Chief Complaint: Auditory hallucinations  Duration: 3 Days  Timing:  Acute  Location:  Quality: N/A  Severity: N/A  Modifying Factors:   Associated Symptoms: denies any other associated signs or symptoms      Additional History (Jonathan Mcmullen is a 61 y.o. male who presents to the emergency department for evaluation of auditory hallucinations for the past 3 days. Patient denies visual hallucinations, SI, HI. He reports compliance with his medications and actually presents to the ED with all of his medications refilled. He denies any EtOH abuse or illicit drug use. Patient reports he just wants to be evaluated for his hallucinations. No other concerns at this time. PCP:  Gloria Gracia MD      Current Outpatient Medications   Medication Sig Dispense Refill    ibuprofen (MOTRIN) 600 mg tablet Take 1 Tab by mouth every eight (8) hours as needed for Pain. 20 Tab 0    lithium carbonate 300 mg tablet Take 300 mg by mouth two (2) times a day. Takes 300 mg in the morning and 600 mg in the evening.  benztropine (COGENTIN) 1 mg tablet Take 1 Tab by mouth two (2) times a day. 28 Tab 0    risperiDONE (RISPERDAL) 3 mg tablet Take 2 Tabs by mouth nightly. 28 Tab 0    topiramate (TOPAMAX) 50 mg tablet Take 1 Tab by mouth two (2) times daily (with meals). 28 Tab 0    traZODone (DESYREL) 100 mg tablet Take 1 Tab by mouth nightly. 14 Tab 0    OLANZapine (ZYPREXA) 20 mg tablet Take 2 Tabs by mouth every evening.  28 Tab 0       Past History     Past Medical History:  Past Medical History:   Diagnosis Date    Psychiatric disorder     schizophrenia, depression       Past Surgical History:  Past Surgical History:   Procedure Laterality Date    HX ORTHOPAEDIC      left arm surgery    HX ORTHOPAEDIC      left leg surgery       Family History:  No family history on file. Social History:  Social History     Tobacco Use    Smoking status: Current Every Day Smoker    Smokeless tobacco: Never Used   Substance Use Topics    Alcohol use: No     Frequency: Never    Drug use: No       Allergies: Allergies   Allergen Reactions    Pcn [Penicillins] Other (comments)         Review of Systems       Review of Systems   Constitutional: Negative for chills and fever. HENT: Negative for congestion, rhinorrhea and sore throat. Respiratory: Negative for cough and shortness of breath. Cardiovascular: Negative for chest pain. Gastrointestinal: Negative for abdominal pain, blood in stool, constipation, diarrhea, nausea and vomiting. Genitourinary: Negative for dysuria, frequency and hematuria. Musculoskeletal: Negative for back pain and myalgias. Skin: Negative for rash and wound. Neurological: Negative for dizziness and headaches. Psychiatric/Behavioral: Positive for hallucinations. All other systems reviewed and are negative. Physical Exam     Visit Vitals  /69 (BP 1 Location: Left arm, BP Patient Position: At rest)   Pulse 86   Temp 98.6 °F (37 °C)   Resp 18   SpO2 100%       Physical Exam  Vitals signs and nursing note reviewed. Constitutional:       General: He is not in acute distress. Appearance: He is well-developed. He is not diaphoretic. Comments: Happy, pleasant, cooperative   HENT:      Head: Normocephalic and atraumatic. Eyes:      Conjunctiva/sclera: Conjunctivae normal.   Neck:      Musculoskeletal: Normal range of motion and neck supple. Cardiovascular:      Rate and Rhythm: Normal rate and regular rhythm. Heart sounds: Normal heart sounds. Pulmonary:      Effort: Pulmonary effort is normal. No respiratory distress. Breath sounds: Normal breath sounds. Chest:      Chest wall: No tenderness. Abdominal:      General: Bowel sounds are normal. There is no distension. Palpations: Abdomen is soft. Tenderness: There is no abdominal tenderness. There is no guarding or rebound. Musculoskeletal: Normal range of motion. General: No deformity. Skin:     General: Skin is warm and dry. Neurological:      Mental Status: He is alert and oriented to person, place, and time. Diagnostic Study Results     Labs -  Recent Results (from the past 12 hour(s))   CBC WITH AUTOMATED DIFF    Collection Time: 01/06/21  3:26 PM   Result Value Ref Range    WBC 7.5 4.6 - 13.2 K/uL    RBC 3.82 (L) 4.70 - 5.50 M/uL    HGB 11.5 (L) 13.0 - 16.0 g/dL    HCT 34.8 (L) 36.0 - 48.0 %    MCV 91.1 74.0 - 97.0 FL    MCH 30.1 24.0 - 34.0 PG    MCHC 33.0 31.0 - 37.0 g/dL    RDW 14.3 11.6 - 14.5 %    PLATELET 646 145 - 367 K/uL    MPV 11.0 9.2 - 11.8 FL    NEUTROPHILS 66 40 - 73 %    LYMPHOCYTES 21 21 - 52 %    MONOCYTES 11 (H) 3 - 10 %    EOSINOPHILS 2 0 - 5 %    BASOPHILS 0 0 - 2 %    ABS. NEUTROPHILS 5.0 1.8 - 8.0 K/UL    ABS. LYMPHOCYTES 1.6 0.9 - 3.6 K/UL    ABS. MONOCYTES 0.8 0.05 - 1.2 K/UL    ABS. EOSINOPHILS 0.1 0.0 - 0.4 K/UL    ABS. BASOPHILS 0.0 0.0 - 0.1 K/UL    DF AUTOMATED     METABOLIC PANEL, COMPREHENSIVE    Collection Time: 01/06/21  3:26 PM   Result Value Ref Range    Sodium 141 136 - 145 mmol/L    Potassium 4.0 3.5 - 5.5 mmol/L    Chloride 109 100 - 111 mmol/L    CO2 29 21 - 32 mmol/L    Anion gap 3 3.0 - 18 mmol/L    Glucose 88 74 - 99 mg/dL    BUN 12 7.0 - 18 MG/DL    Creatinine 1.45 (H) 0.6 - 1.3 MG/DL    BUN/Creatinine ratio 8 (L) 12 - 20      GFR est AA >60 >60 ml/min/1.73m2    GFR est non-AA 50 (L) >60 ml/min/1.73m2    Calcium 9.2 8.5 - 10.1 MG/DL    Bilirubin, total 0.3 0.2 - 1.0 MG/DL    ALT (SGPT) 23 16 - 61 U/L    AST (SGOT) 16 10 - 38 U/L    Alk.  phosphatase 82 45 - 117 U/L    Protein, total 7.3 6.4 - 8.2 g/dL    Albumin 3.4 3.4 - 5.0 g/dL    Globulin 3.9 2.0 - 4.0 g/dL    A-G Ratio 0.9 0.8 - 1.7     ETHYL ALCOHOL    Collection Time: 01/06/21  3:26 PM   Result Value Ref Range    ALCOHOL(ETHYL),SERUM <3 0 - 3 MG/DL   URINALYSIS W/ RFLX MICROSCOPIC    Collection Time: 01/06/21  3:27 PM   Result Value Ref Range    Color YELLOW      Appearance CLEAR      Specific gravity <1.005 (L) 1.005 - 1.030    pH (UA) 6.5 5.0 - 8.0      Protein Negative NEG mg/dL    Glucose Negative NEG mg/dL    Ketone Negative NEG mg/dL    Bilirubin Negative NEG      Blood Negative NEG      Urobilinogen 0.2 0.2 - 1.0 EU/dL    Nitrites Negative NEG      Leukocyte Esterase Negative NEG     DRUG SCREEN, URINE    Collection Time: 01/06/21  3:27 PM   Result Value Ref Range    BENZODIAZEPINES Negative NEG      BARBITURATES Negative NEG      THC (TH-CANNABINOL) Negative NEG      OPIATES Negative NEG      PCP(PHENCYCLIDINE) Negative NEG      COCAINE Negative NEG      AMPHETAMINES Negative NEG      METHADONE Negative NEG      HDSCOM (NOTE)        Radiologic Studies -   No results found. Medical Decision Making   I am the first provider for this patient. I reviewed the vital signs, available nursing notes, past medical history, past surgical history, family history and social history. Vital Signs-Reviewed the patient's vital signs. Pulse Oximetry Analysis -  100% on room air (Interpretation)    Records Reviewed: Nursing Notes and Old Medical Records (Time of Review: 4:20 PM)    ED Course: Progress Notes, Reevaluation, and Consults:  4:23 PM: Medically cleared. Discussed case with Wayne Brown and crisis. She will be down to evaluate patient shortly. Provider Notes (Medical Decision Making):   Differential Diagnosis: substance abuse, alcohol intoxication, substance withdrawal, acute psychotic episode, anxiety, panic disorder, chente, undifferentiated schizophrenia, depression, SI, HI, medication non-compliance, other mood disorder    Plan: Patient presents ambulatory in no significant distress with normal vitals.   Medically cleared. Patient has been evaluated by crisis and found not to meet inpatient criteria. Patient has plenty of his medications. He is safe to discharge home at this time. No SI or HI. At this time, patient is stable and appropriate for discharge home. Patient demonstrates understanding of current diagnoses and is in agreement with the treatment plan. They are advised that while the likelihood of serious underlying condition is low at this point given the evaluation performed today, we cannot fully rule it out. They are advised to immediately return with any new symptoms or worsening of current condition. All questions have been answered. Patient is given educational material regarding their diagnoses, including danger symptoms and when to return to the ED. Diagnosis     Clinical Impression:   1. Auditory hallucinations        Disposition: DC Home    Follow-up Information     Follow up With Specialties Details Why Contact Info    [facility] EMERGENCY DEPT Emergency Medicine Go to  As needed [address]  [address]  Call  For follow-up 1 [facility]  [address] [place] [address] Rd  [phone]           Patient's Medications   Start Taking    No medications on file   Continue Taking    BENZTROPINE (COGENTIN) 1 MG TABLET    Take 1 Tab by mouth two (2) times a day. IBUPROFEN (MOTRIN) 600 MG TABLET    Take 1 Tab by mouth every eight (8) hours as needed for Pain. LITHIUM CARBONATE 300 MG TABLET    Take 300 mg by mouth two (2) times a day. Takes 300 mg in the morning and 600 mg in the evening. OLANZAPINE (ZYPREXA) 20 MG TABLET    Take 2 Tabs by mouth every evening. RISPERIDONE (RISPERDAL) 3 MG TABLET    Take 2 Tabs by mouth nightly. TOPIRAMATE (TOPAMAX) 50 MG TABLET    Take 1 Tab by mouth two (2) times daily (with meals). TRAZODONE (DESYREL) 100 MG TABLET    Take 1 Tab by mouth nightly.    These Medications have changed No medications on file   Stop Taking    No medications on file     _______________________________    This note was dictated utilizing voice recognition software which may lead to typographical errors. I apologize in advance if the situation occurs. If questions arise please do not hesitate to contact me or call our department.   Pepper Sapp PA-C

## 2021-01-06 NOTE — ED TRIAGE NOTES
Has been hearing things in his head for the past 3 days.  Says he's been taking his medicine as prescribed

## 2021-01-06 NOTE — ED NOTES
I performed a brief evaluation, including history and physical, of the patient here in triage and I have determined that pt will need further treatment and evaluation from the main side ER physician. I have placed initial orders to help in expediting patients care.      January 06, 2021 at 3:24 PM - Trino Brito PA-C        Visit Vitals  /69 (BP 1 Location: Left arm, BP Patient Position: At rest)   Pulse 86   Temp 98.6 °F (37 °C)   Resp 18   SpO2 100%

## 2021-01-07 NOTE — BSMART NOTE
Comprehensive Assessment Integrated Summary Patient is a 61year old male who presented to the emergency room with c/o \"I had an argument with a girl and my sister. My sister took the girl's side, so I did what's best and agreed to leave the house. I been having A/V hallucinations for 3 days. \" Patient verbalized that he takes his medications everyday. Patient denied thoughts, plans, or intentions of harm towards self or others. Patient also denied command auditory hallucinations. Mental Status Exam 
 
The patient's appearance shows no evidence of impairment. The patient's behavior calm, cooperative, pleasant. The patient is oriented to time, place, person and situation. The patient's speech shows no evidence of impairment. The patient's mood \"allen. \"  The range of affect oddly smiling. The patient's thought content demonstrates no evidence of impairment. The thought process shows no evidence of impairment. The patient's perception demonstrated changes in the following:  auditory  visual hallucinations. The patient's memory shows no evidence of impairment. The patient's appetite shows no evidence of impairment. The patient's sleep shows no evidence of impairment. The patient's insight shows no evidence of impairment. The patient's judgement shows no evidence of impairment. DME: Alejandra Mcfarlane Access to weapons: Denied Substance Abuse: Denied Outpatient Care: \"[facility]\" Inpatient Services: \"[facility]\" Contact/Support Person: \"Ashanti, my sister\" Disposition Discussed with NICHOLAS Mcconnell, patient does not meet criteria for acute psychiatric admission. Patient will follow-up with outpatient provider. Patient discharged per ED.   
 
Stef Baig, RN, BSN

## 2021-02-07 ENCOUNTER — HOSPITAL ENCOUNTER (EMERGENCY)
Age: 61
Discharge: HOME | End: 2021-02-07
Attending: EMERGENCY MEDICINE
Payer: MEDICAID

## 2021-02-07 VITALS
OXYGEN SATURATION: 100 % | BODY MASS INDEX: 19.64 KG/M2 | HEIGHT: 74 IN | RESPIRATION RATE: 16 BRPM | DIASTOLIC BLOOD PRESSURE: 81 MMHG | SYSTOLIC BLOOD PRESSURE: 142 MMHG | TEMPERATURE: 97.8 F | HEART RATE: 86 BPM

## 2021-02-07 DIAGNOSIS — Z23 ENCOUNTER FOR IMMUNIZATION: ICD-10-CM

## 2021-02-07 DIAGNOSIS — B35.1 ONYCHOMYCOSIS: Primary | ICD-10-CM

## 2021-02-07 PROCEDURE — 90471 IMMUNIZATION ADMIN: CPT

## 2021-02-07 PROCEDURE — 90686 IIV4 VACC NO PRSV 0.5 ML IM: CPT | Performed by: EMERGENCY MEDICINE

## 2021-02-07 PROCEDURE — 99281 EMR DPT VST MAYX REQ PHY/QHP: CPT

## 2021-02-07 PROCEDURE — 74011250636 HC RX REV CODE- 250/636: Performed by: EMERGENCY MEDICINE

## 2021-02-07 RX ORDER — TERBINAFINE HYDROCHLORIDE 250 MG/1
250 TABLET ORAL DAILY
Qty: 42 TAB | Refills: 1 | Status: SHIPPED | OUTPATIENT
Start: 2021-02-07

## 2021-02-07 RX ADMIN — INFLUENZA VIRUS VACCINE 0.5 ML: 15; 15; 15; 15 SUSPENSION INTRAMUSCULAR at 13:55

## 2021-02-07 NOTE — ED TRIAGE NOTES
Pt here requesting to get toenails cut. Unable to ascertain from pt why he cannot cut his own nails.

## 2021-02-07 NOTE — ED PROVIDER NOTES
EMERGENCY DEPARTMENT HISTORY AND PHYSICAL EXAM    Date: 2/7/2021  Patient Name: Marisela Guido    History of Presenting Illness     Chief Complaint   Patient presents with    Other         History Provided By: Patient    Additional History (Context): Marisela Guido is a 61 y.o. male with schizophrenia who presents with [facility] for evaluation of his toenails. Denies any injury he says he just cannot get the collar off those toenails. Also says he like to have a flu shot. Denies fever nausea vomiting cough diarrhea. PCP: Taty Cagle MD    Current Facility-Administered Medications   Medication Dose Route Frequency Provider Last Rate Last Admin    influenza vaccine 2020-21 (6 mos+)(PF) (FLUARIX/FLULAVAL/FLUZONE QUAD) injection 0.5 mL  0.5 mL IntraMUSCular PRIOR TO DISCHARGE Tanisha Brown PA         Current Outpatient Medications   Medication Sig Dispense Refill    terbinafine HCL (LAMISIL) 250 mg tablet Take 1 Tab by mouth daily. 42 Tab 1    ibuprofen (MOTRIN) 600 mg tablet Take 1 Tab by mouth every eight (8) hours as needed for Pain. 20 Tab 0    lithium carbonate 300 mg tablet Take 300 mg by mouth two (2) times a day. Takes 300 mg in the morning and 600 mg in the evening.  benztropine (COGENTIN) 1 mg tablet Take 1 Tab by mouth two (2) times a day. 28 Tab 0    risperiDONE (RISPERDAL) 3 mg tablet Take 2 Tabs by mouth nightly. 28 Tab 0    topiramate (TOPAMAX) 50 mg tablet Take 1 Tab by mouth two (2) times daily (with meals). 28 Tab 0    traZODone (DESYREL) 100 mg tablet Take 1 Tab by mouth nightly. 14 Tab 0    OLANZapine (ZYPREXA) 20 mg tablet Take 2 Tabs by mouth every evening.  28 Tab 0       Past History     Past Medical History:  Past Medical History:   Diagnosis Date    Psychiatric disorder     schizophrenia, depression       Past Surgical History:  Past Surgical History:   Procedure Laterality Date    HX ORTHOPAEDIC      left arm surgery    HX ORTHOPAEDIC      left leg surgery Family History:  No family history on file. Social History:  Social History     Tobacco Use    Smoking status: Current Every Day Smoker    Smokeless tobacco: Never Used   Substance Use Topics    Alcohol use: No     Frequency: Never    Drug use: No       Allergies: Allergies   Allergen Reactions    Pcn [Penicillins] Other (comments)         Review of Systems   Review of Systems   Constitutional: Negative. HENT: Negative. Eyes: Negative. Respiratory: Negative. Cardiovascular: Negative. Gastrointestinal: Negative. Endocrine: Negative. Genitourinary: Negative. Musculoskeletal: Negative. Skin: Positive for color change. Allergic/Immunologic: Negative. Neurological: Negative. Hematological: Negative. Psychiatric/Behavioral: Negative. All Other Systems Negative  Physical Exam     Vitals:    02/07/21 1237   BP: (!) 142/81   Pulse: 86   Resp: 16   Temp: 97.8 °F (36.6 °C)   SpO2: 100%   Height: 6' 2\" (1.88 m)     Physical Exam  Vitals signs and nursing note reviewed. Constitutional:       General: He is not in acute distress. Appearance: He is well-developed. He is not ill-appearing, toxic-appearing or diaphoretic. HENT:      Head: Normocephalic and atraumatic. Neck:      Musculoskeletal: Normal range of motion and neck supple. Thyroid: No thyromegaly. Vascular: No carotid bruit. Trachea: No tracheal deviation. Cardiovascular:      Rate and Rhythm: Normal rate and regular rhythm. Heart sounds: Normal heart sounds. No murmur. No friction rub. No gallop. Pulmonary:      Effort: Pulmonary effort is normal. No respiratory distress. Breath sounds: Normal breath sounds. No stridor. No wheezing or rales. Chest:      Chest wall: No tenderness. Abdominal:      General: There is no distension. Palpations: Abdomen is soft. There is no mass. Tenderness: There is no abdominal tenderness. There is no guarding or rebound. Musculoskeletal: Normal range of motion. Skin:     General: Skin is warm and dry. Coloration: Skin is not pale. Comments: Patient's toenails are darkened thick yellowed. Cap refill less than 2 seconds each digit. No open lesions in the webspaces. DP PT pulses palpable. Neurological:      Mental Status: He is alert. Psychiatric:         Speech: Speech normal.         Behavior: Behavior normal.         Thought Content: Thought content normal.         Judgment: Judgment normal.        Diagnostic Study Results     Labs -   No results found for this or any previous visit (from the past 12 hour(s)). Radiologic Studies -   No orders to display     CT Results  (Last 48 hours)    None        CXR Results  (Last 48 hours)    None            Medical Decision Making   I am the first provider for this patient. I reviewed the vital signs, available nursing notes, past medical history, past surgical history, family history and social history. Vital Signs-Reviewed the patient's vital signs. Procedures:  Procedures    Provider Notes (Medical Decision Making): Oral terbinafine sent to preferred pharmacy and will give influenza vaccine. MED RECONCILIATION:  Current Facility-Administered Medications   Medication Dose Route Frequency    influenza vaccine 2020-21 (6 mos+)(PF) (FLUARIX/FLULAVAL/FLUZONE QUAD) injection 0.5 mL  0.5 mL IntraMUSCular PRIOR TO DISCHARGE     Current Outpatient Medications   Medication Sig    terbinafine HCL (LAMISIL) 250 mg tablet Take 1 Tab by mouth daily.  ibuprofen (MOTRIN) 600 mg tablet Take 1 Tab by mouth every eight (8) hours as needed for Pain.  lithium carbonate 300 mg tablet Take 300 mg by mouth two (2) times a day. Takes 300 mg in the morning and 600 mg in the evening.  benztropine (COGENTIN) 1 mg tablet Take 1 Tab by mouth two (2) times a day.  risperiDONE (RISPERDAL) 3 mg tablet Take 2 Tabs by mouth nightly.     topiramate (TOPAMAX) 50 mg tablet Take 1 Tab by mouth two (2) times daily (with meals).  traZODone (DESYREL) 100 mg tablet Take 1 Tab by mouth nightly.  OLANZapine (ZYPREXA) 20 mg tablet Take 2 Tabs by mouth every evening. Disposition:  home    DISCHARGE NOTE:   1:23 PM    Pt has been reexamined. Patient has no new complaints, changes, or physical findings. Care plan outlined and precautions discussed. Results of exam were reviewed with the patient. All medications were reviewed with the patient; will d/c home with terbinafine. All of pt's questions and concerns were addressed. Patient was instructed and agrees to follow up with PCP, as well as to return to the ED upon further deterioration. Patient is ready to go home. Follow-up Information     Follow up With Specialties Details Why Contact Info    Emmett Jones MD Family Medicine Schedule an appointment as soon as possible for a visit in 1 day  [address] [facility] [address] [facility] [address] [place] [address]      Vickey Campo DPM Podiatry Schedule an appointment as soon as possible for a visit in 1 day  [address] [place] [id number]  [phone] [address] [facility] EMERGENCY DEPT Emergency Medicine  If symptoms worsen return immediately [address] [facility] [address]  [phone]          Current Discharge Medication List      START taking these medications    Details   terbinafine HCL (LAMISIL) 250 mg tablet Take 1 Tab by mouth daily. Qty: 42 Tab, Refills: 1             Diagnosis     Clinical Impression:   1. Onychomycosis    2.  Encounter for immunization

## 2024-01-03 ENCOUNTER — HOSPITAL ENCOUNTER (EMERGENCY)
Facility: HOSPITAL | Age: 64
Discharge: HOME | End: 2024-01-03
Attending: EMERGENCY MEDICINE

## 2024-01-03 VITALS
OXYGEN SATURATION: 100 % | DIASTOLIC BLOOD PRESSURE: 70 MMHG | TEMPERATURE: 97.8 F | BODY MASS INDEX: 20.45 KG/M2 | RESPIRATION RATE: 18 BRPM | SYSTOLIC BLOOD PRESSURE: 119 MMHG | HEIGHT: 72 IN | WEIGHT: 151 LBS | HEART RATE: 85 BPM

## 2024-01-03 DIAGNOSIS — S61.212D LACERATION OF RIGHT MIDDLE FINGER WITHOUT FOREIGN BODY WITHOUT DAMAGE TO NAIL, SUBSEQUENT ENCOUNTER: ICD-10-CM

## 2024-01-03 DIAGNOSIS — Z48.02 VISIT FOR SUTURE REMOVAL: Primary | ICD-10-CM

## 2024-01-03 PROCEDURE — 4500000002 HC ER NO CHARGE
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
## 2024-01-24 NOTE — DISCHARGE INSTRUCTIONS
Please return immediately to the Emergency Room for re-evaluation if you are not improving, develop any new symptoms, or develop worsening of current symptoms! If you have been prescribed a medication and are unable to take this medication for any reason, please return to the Emergency Department for further evaluation! If you have been referred for follow-up to a specialist, but are unable to follow-up and your symptoms are either not improving or are worsening, please return to the Emergency Department for further evaluation! [address]

## 2024-03-20 ENCOUNTER — HOSPITAL ENCOUNTER (EMERGENCY)
Facility: HOSPITAL | Age: 64
Discharge: HOME | End: 2024-03-20
Payer: MEDICAID

## 2024-03-20 VITALS
WEIGHT: 150 LBS | DIASTOLIC BLOOD PRESSURE: 78 MMHG | HEIGHT: 66 IN | BODY MASS INDEX: 24.11 KG/M2 | RESPIRATION RATE: 16 BRPM | OXYGEN SATURATION: 100 % | TEMPERATURE: 98.3 F | SYSTOLIC BLOOD PRESSURE: 147 MMHG | HEART RATE: 94 BPM

## 2024-03-20 DIAGNOSIS — F20.9 SCHIZOPHRENIA, UNSPECIFIED TYPE (HCC): Primary | ICD-10-CM

## 2024-03-20 PROCEDURE — 99283 EMERGENCY DEPT VISIT LOW MDM: CPT

## 2024-03-20 ASSESSMENT — ENCOUNTER SYMPTOMS
ABDOMINAL PAIN: 0
DIARRHEA: 0
WHEEZING: 0
NAUSEA: 0
SHORTNESS OF BREATH: 0
CONSTIPATION: 0
EYE DISCHARGE: 0
VOMITING: 0
RHINORRHEA: 0
SORE THROAT: 0
COUGH: 0
STRIDOR: 0
BACK PAIN: 0
EYE REDNESS: 0

## 2024-03-20 ASSESSMENT — PAIN - FUNCTIONAL ASSESSMENT: PAIN_FUNCTIONAL_ASSESSMENT: NONE - DENIES PAIN

## 2024-03-20 NOTE — ED TRIAGE NOTES
Pt brought in via EMS caregiver states pt is out of medication for bipolar and schizophrenia. . Friendly with EMS.

## 2024-03-20 NOTE — ED PROVIDER NOTES
computer software.  Please disregard these errors.  Please excuse any errors that have escaped final proofreading.      MED RECONCILIATION:  No current facility-administered medications for this encounter.     Current Outpatient Medications   Medication Sig    benztropine (COGENTIN) 1 MG tablet Take 1 mg by mouth 2 times daily    ibuprofen (ADVIL;MOTRIN) 600 MG tablet Take 600 mg by mouth every 8 hours as needed    lithium 300 MG tablet Take 300 mg by mouth 2 times daily    OLANZapine (ZYPREXA) 20 MG tablet Take 40 mg by mouth every evening    risperiDONE (RISPERDAL) 3 MG tablet Take 6 mg by mouth    terbinafine (LAMISIL) 250 MG tablet Take 250 mg by mouth daily    topiramate (TOPAMAX) 50 MG tablet Take 50 mg by mouth 2 times daily (with meals)    traZODone (DESYREL) 100 MG tablet Take 100 mg by mouth       Disposition:  D/c       Medication List        ASK your doctor about these medications      benztropine 1 MG tablet  Commonly known as: COGENTIN     ibuprofen 600 MG tablet  Commonly known as: ADVIL;MOTRIN     lithium 300 MG tablet     OLANZapine 20 MG tablet  Commonly known as: ZYPREXA     risperiDONE 3 MG tablet  Commonly known as: RISPERDAL     terbinafine 250 MG tablet  Commonly known as: LAMISIL     topiramate 50 MG tablet  Commonly known as: TOPAMAX     traZODone 100 MG tablet  Commonly known as: DESYREL                  Diagnosis     Clinical Impression:   1. Schizophrenia, unspecified type (HCC)                Lorene Styles PA-C  03/20/24 1932
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
## 2024-03-22 ENCOUNTER — APPOINTMENT (OUTPATIENT)
Facility: HOSPITAL | Age: 64
End: 2024-03-22
Payer: MEDICAID

## 2024-03-22 ENCOUNTER — HOSPITAL ENCOUNTER (EMERGENCY)
Facility: HOSPITAL | Age: 64
Discharge: HOME | End: 2024-03-22
Payer: MEDICAID

## 2024-03-22 VITALS
BODY MASS INDEX: 28.93 KG/M2 | HEIGHT: 66 IN | RESPIRATION RATE: 18 BRPM | TEMPERATURE: 98.2 F | HEART RATE: 102 BPM | SYSTOLIC BLOOD PRESSURE: 139 MMHG | DIASTOLIC BLOOD PRESSURE: 85 MMHG | OXYGEN SATURATION: 99 % | WEIGHT: 180 LBS

## 2024-03-22 DIAGNOSIS — K02.9 DENTAL CARIES: Primary | ICD-10-CM

## 2024-03-22 DIAGNOSIS — S00.83XA FACIAL HEMATOMA, INITIAL ENCOUNTER: ICD-10-CM

## 2024-03-22 DIAGNOSIS — K04.7 PERIAPICAL ABSCESS WITHOUT SINUS TRACT: ICD-10-CM
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
## 2024-03-22 PROCEDURE — 99284 EMERGENCY DEPT VISIT MOD MDM: CPT

## 2024-03-22 PROCEDURE — 72125 CT NECK SPINE W/O DYE: CPT

## 2024-03-22 PROCEDURE — 70450 CT HEAD/BRAIN W/O DYE: CPT

## 2024-03-22 PROCEDURE — 6370000000 HC RX 637 (ALT 250 FOR IP): Performed by: PHYSICIAN ASSISTANT

## 2024-03-22 PROCEDURE — 70486 CT MAXILLOFACIAL W/O DYE: CPT

## 2024-03-22 RX ORDER — IBUPROFEN 600 MG/1
600 TABLET ORAL
Status: COMPLETED | OUTPATIENT
Start: 2024-03-22 | End: 2024-03-22

## 2024-03-22 RX ORDER — IBUPROFEN 600 MG/1
600 TABLET ORAL EVERY 6 HOURS PRN
Qty: 60 TABLET | Refills: 0 | Status: SHIPPED | OUTPATIENT
Start: 2024-03-22

## 2024-03-22 RX ORDER — OXYCODONE HYDROCHLORIDE AND ACETAMINOPHEN 5; 325 MG/1; MG/1
1 TABLET ORAL
Status: COMPLETED | OUTPATIENT
Start: 2024-03-22 | End: 2024-03-22

## 2024-03-22 RX ORDER — CLINDAMYCIN HYDROCHLORIDE 300 MG/1
300 CAPSULE ORAL 4 TIMES DAILY
Qty: 40 CAPSULE | Refills: 0 | Status: SHIPPED | OUTPATIENT
Start: 2024-03-22 | End: 2024-04-01

## 2024-03-22 RX ORDER — CLINDAMYCIN HYDROCHLORIDE 150 MG/1
300 CAPSULE ORAL
Status: COMPLETED | OUTPATIENT
Start: 2024-03-22 | End: 2024-03-22

## 2024-03-22 RX ADMIN — OXYCODONE AND ACETAMINOPHEN 1 TABLET: 5; 325 TABLET ORAL at 22:18

## 2024-03-22 RX ADMIN — CLINDAMYCIN HYDROCHLORIDE 300 MG: 150 CAPSULE ORAL at 22:18

## 2024-03-22 RX ADMIN — IBUPROFEN 600 MG: 600 TABLET ORAL at 22:18

## 2024-03-22 ASSESSMENT — ENCOUNTER SYMPTOMS
EYE DISCHARGE: 0
VOMITING: 0
NAUSEA: 0
CONSTIPATION: 0
WHEEZING: 0
COUGH: 0
ABDOMINAL PAIN: 0
SORE THROAT: 0
RHINORRHEA: 0
DIARRHEA: 0
BACK PAIN: 0
EYE REDNESS: 0

## 2024-03-22 ASSESSMENT — PAIN SCALES - GENERAL: PAINLEVEL_OUTOF10: 7

## 2024-03-22 ASSESSMENT — PAIN - FUNCTIONAL ASSESSMENT: PAIN_FUNCTIONAL_ASSESSMENT: 0-10
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
## 2024-03-23 ENCOUNTER — HOSPITAL ENCOUNTER (EMERGENCY)
Facility: HOSPITAL | Age: 64
Discharge: HOME | End: 2024-03-23
Payer: MEDICAID

## 2024-03-23 VITALS
TEMPERATURE: 98.2 F | RESPIRATION RATE: 17 BRPM | SYSTOLIC BLOOD PRESSURE: 130 MMHG | DIASTOLIC BLOOD PRESSURE: 77 MMHG | OXYGEN SATURATION: 98 % | HEART RATE: 72 BPM
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
## 2024-03-23 DIAGNOSIS — R22.0 FACIAL SWELLING: Primary | ICD-10-CM

## 2024-03-23 PROCEDURE — 99283 EMERGENCY DEPT VISIT LOW MDM: CPT

## 2024-03-23 PROCEDURE — 6370000000 HC RX 637 (ALT 250 FOR IP)

## 2024-03-23 RX ORDER — CLINDAMYCIN HYDROCHLORIDE 150 MG/1
300 CAPSULE ORAL ONCE
Status: COMPLETED | OUTPATIENT
Start: 2024-03-23 | End: 2024-03-23

## 2024-03-23 RX ADMIN — CLINDAMYCIN HYDROCHLORIDE 300 MG: 150 CAPSULE ORAL at 08:21

## 2024-03-23 ASSESSMENT — LIFESTYLE VARIABLES
HOW MANY STANDARD DRINKS CONTAINING ALCOHOL DO YOU HAVE ON A TYPICAL DAY: PATIENT DOES NOT DRINK
HOW OFTEN DO YOU HAVE A DRINK CONTAINING ALCOHOL: NEVER

## 2024-03-23 ASSESSMENT — ENCOUNTER SYMPTOMS
ABDOMINAL PAIN: 0
DIARRHEA: 0
FACIAL SWELLING: 1
NAUSEA: 0
VOMITING: 0
CONSTIPATION: 0
COUGH: 0
CHEST TIGHTNESS: 0

## 2024-03-23 ASSESSMENT — PAIN SCALES - GENERAL: PAINLEVEL_OUTOF10: 6

## 2024-03-23 ASSESSMENT — PAIN - FUNCTIONAL ASSESSMENT: PAIN_FUNCTIONAL_ASSESSMENT: 0-10
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
## 2024-03-23 NOTE — DISCHARGE INSTRUCTIONS
Please take the antibiotic that you were prescribed.  Place ice on the face to decrease swelling and pain.   Follow-up with PCP within the next few days in order to be re-evaluated.  Return to the ED with any new or worsening symptoms.
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
## 2024-04-07 ENCOUNTER — HOSPITAL ENCOUNTER (EMERGENCY)
Facility: HOSPITAL | Age: 64
Discharge: HOME | End: 2024-04-08
Payer: MEDICAID

## 2024-04-07 ENCOUNTER — APPOINTMENT (OUTPATIENT)
Facility: HOSPITAL | Age: 64
End: 2024-04-07
Payer: MEDICAID

## 2024-04-07 DIAGNOSIS — S09.90XA CLOSED HEAD INJURY, INITIAL ENCOUNTER: ICD-10-CM

## 2024-04-07 DIAGNOSIS — F20.9 SCHIZOPHRENIA, UNSPECIFIED TYPE (HCC): ICD-10-CM

## 2024-04-07 DIAGNOSIS — S50.12XA CONTUSION OF LEFT FOREARM, INITIAL ENCOUNTER: Primary | ICD-10-CM

## 2024-04-07 PROCEDURE — 70450 CT HEAD/BRAIN W/O DYE: CPT

## 2024-04-07 PROCEDURE — 99284 EMERGENCY DEPT VISIT MOD MDM: CPT

## 2024-04-07 PROCEDURE — 73060 X-RAY EXAM OF HUMERUS: CPT

## 2024-04-07 PROCEDURE — 73090 X-RAY EXAM OF FOREARM: CPT

## 2024-04-07 PROCEDURE — 72125 CT NECK SPINE W/O DYE: CPT

## 2024-04-07 ASSESSMENT — PAIN DESCRIPTION - LOCATION: LOCATION: ARM

## 2024-04-07 ASSESSMENT — PAIN DESCRIPTION - ORIENTATION: ORIENTATION: LEFT;DISTAL

## 2024-04-07 ASSESSMENT — PAIN SCALES - GENERAL: PAINLEVEL_OUTOF10: 6

## 2024-04-07 ASSESSMENT — PAIN DESCRIPTION - PAIN TYPE: TYPE: ACUTE PAIN

## 2024-04-07 ASSESSMENT — PAIN - FUNCTIONAL ASSESSMENT: PAIN_FUNCTIONAL_ASSESSMENT: 0-10

## 2024-04-08 VITALS
DIASTOLIC BLOOD PRESSURE: 74 MMHG | TEMPERATURE: 98.7 F | OXYGEN SATURATION: 99 % | HEIGHT: 66 IN | HEART RATE: 98 BPM | BODY MASS INDEX: 25.71 KG/M2 | SYSTOLIC BLOOD PRESSURE: 138 MMHG | RESPIRATION RATE: 18 BRPM | WEIGHT: 160 LBS

## 2024-04-08 ASSESSMENT — PAIN SCALES - GENERAL: PAINLEVEL_OUTOF10: 0

## 2024-04-08 NOTE — ED PROVIDER NOTES
[facility] EMERGENCY DEPT  EMERGENCY DEPARTMENT ENCOUNTER      Pt Name: Brijesh Hale  MRN: [id number]  Birthdate 1960  Date of evaluation: 4/7/2024  Provider: AMINA Reece  12:30 AM    CHIEF COMPLAINT       Chief Complaint   Patient presents with    Arm Injury         HISTORY OF PRESENT ILLNESS    Brijesh Hale is a 63 y.o. male who presents to the emergency department with left arm pain after falling in a tussle with the dog.  Denies any animal bites.  But he fell on his left arm and he hit the side of his head.  Denies loss of consciousness.    Patient is very poor historian secondary to his schizophrenia.    HPI    Nursing Notes were reviewed.    REVIEW OF SYSTEMS       Review of Systems   Constitutional: Negative.    HENT: Negative.     Eyes: Negative.    Respiratory: Negative.     Cardiovascular: Negative.    Gastrointestinal: Negative.    Endocrine: Negative.    Genitourinary: Negative.    Musculoskeletal:  Positive for arthralgias and myalgias.   Skin: Negative.  Negative for wound.   Allergic/Immunologic: Negative.    Neurological: Negative.  Negative for weakness and numbness.   Hematological:  Does not bruise/bleed easily.   Psychiatric/Behavioral:  Positive for confusion.        Except as noted above the remainder of the review of systems was reviewed and negative.       PAST MEDICAL HISTORY     Past Medical History:   Diagnosis Date    Psychiatric disorder     schizophrenia, depression    Schizophrenia (HCC)          SURGICAL HISTORY       Past Surgical History:   Procedure Laterality Date    ORTHOPEDIC SURGERY      left leg surgery    ORTHOPEDIC SURGERY      left arm surgery         CURRENT MEDICATIONS       Previous Medications    BENZTROPINE (COGENTIN) 1 MG TABLET    Take 1 mg by mouth 2 times daily    IBUPROFEN (IBU) 600 MG TABLET    Take 1 tablet by mouth every 6 hours as needed for Pain    LITHIUM 300 MG TABLET    Take 300 mg by mouth 2 times daily    OLANZAPINE (ZYPREXA) 20 MG TABLET    Take 40 mg

## 2024-04-08 NOTE — ED TRIAGE NOTES
Pt presents to ED c/o L distal arm pain. Pt states a dog was chasing him when it got dark and he fell. Pt states he hit his L arm and his head but didn't lose consciousness.

## 2024-04-13 ENCOUNTER — HOSPITAL ENCOUNTER (EMERGENCY)
Facility: HOSPITAL | Age: 64
Discharge: HOME | End: 2024-04-14
Attending: STUDENT IN AN ORGANIZED HEALTH CARE EDUCATION/TRAINING PROGRAM
Payer: MEDICAID

## 2024-04-13 VITALS
BODY MASS INDEX: 25.71 KG/M2 | RESPIRATION RATE: 16 BRPM | HEART RATE: 81 BPM | WEIGHT: 160 LBS | TEMPERATURE: 97.1 F | HEIGHT: 66 IN | DIASTOLIC BLOOD PRESSURE: 73 MMHG | OXYGEN SATURATION: 99 % | SYSTOLIC BLOOD PRESSURE: 134 MMHG

## 2024-04-13 DIAGNOSIS — K59.00 CONSTIPATION, UNSPECIFIED CONSTIPATION TYPE: Primary | ICD-10-CM

## 2024-04-13 DIAGNOSIS — R74.8 ELEVATED LIVER ENZYMES: ICD-10-CM

## 2024-04-13 PROCEDURE — 99285 EMERGENCY DEPT VISIT HI MDM: CPT

## 2024-04-14 ENCOUNTER — APPOINTMENT (OUTPATIENT)
Facility: HOSPITAL | Age: 64
End: 2024-04-14
Payer: MEDICAID

## 2024-04-14 LAB
ALBUMIN SERPL-MCNC: 4.2 G/DL (ref 3.4–5)
ALBUMIN/GLOB SERPL: 0.9 (ref 0.8–1.7)
ALP SERPL-CCNC: 133 U/L (ref 45–117)
ALT SERPL-CCNC: 64 U/L (ref 16–61)
ANION GAP SERPL CALC-SCNC: 4 MMOL/L (ref 3–18)
APPEARANCE UR: CLEAR
AST SERPL-CCNC: 101 U/L (ref 10–38)
BASOPHILS # BLD: 0 K/UL (ref 0–0.1)
BASOPHILS NFR BLD: 0 % (ref 0–2)
BILIRUB DIRECT SERPL-MCNC: 0.2 MG/DL (ref 0–0.2)
BILIRUB SERPL-MCNC: 0.5 MG/DL (ref 0.2–1)
BILIRUB UR QL: NEGATIVE
BUN SERPL-MCNC: 30 MG/DL (ref 7–18)
BUN/CREAT SERPL: 19 (ref 12–20)
CALCIUM SERPL-MCNC: 9.2 MG/DL (ref 8.5–10.1)
CHLORIDE SERPL-SCNC: 106 MMOL/L (ref 100–111)
CO2 SERPL-SCNC: 27 MMOL/L (ref 21–32)
COLOR UR: YELLOW
CREAT SERPL-MCNC: 1.58 MG/DL (ref 0.6–1.3)
DIFFERENTIAL METHOD BLD: ABNORMAL
EOSINOPHIL # BLD: 0.1 K/UL (ref 0–0.4)
EOSINOPHIL NFR BLD: 1 % (ref 0–5)
ERYTHROCYTE [DISTWIDTH] IN BLOOD BY AUTOMATED COUNT: 14.4 % (ref 11.6–14.5)
GLOBULIN SER CALC-MCNC: 4.6 G/DL (ref 2–4)
GLUCOSE SERPL-MCNC: 72 MG/DL (ref 74–99)
GLUCOSE UR STRIP.AUTO-MCNC: NEGATIVE MG/DL
HCT VFR BLD AUTO: 38.4 % (ref 36–48)
HGB BLD-MCNC: 12.5 G/DL (ref 13–16)
HGB UR QL STRIP: NEGATIVE
IMM GRANULOCYTES # BLD AUTO: 0 K/UL (ref 0–0.04)
IMM GRANULOCYTES NFR BLD AUTO: 0 % (ref 0–0.5)
KETONES UR QL STRIP.AUTO: NEGATIVE MG/DL
LEUKOCYTE ESTERASE UR QL STRIP.AUTO: NEGATIVE
LIPASE SERPL-CCNC: 35 U/L (ref 13–75)
LYMPHOCYTES # BLD: 1 K/UL (ref 0.9–3.6)
LYMPHOCYTES NFR BLD: 12 % (ref 21–52)
MCH RBC QN AUTO: 30.6 PG (ref 24–34)
MCHC RBC AUTO-ENTMCNC: 32.6 G/DL (ref 31–37)
MCV RBC AUTO: 93.9 FL (ref 78–100)
MONOCYTES # BLD: 0.4 K/UL (ref 0.05–1.2)
MONOCYTES NFR BLD: 5 % (ref 3–10)
NEUTS SEG # BLD: 6.5 K/UL (ref 1.8–8)
NEUTS SEG NFR BLD: 81 % (ref 40–73)
NITRITE UR QL STRIP.AUTO: NEGATIVE
NRBC # BLD: 0 K/UL (ref 0–0.01)
NRBC BLD-RTO: 0 PER 100 WBC
PH UR STRIP: 5.5 (ref 5–8)
PLATELET # BLD AUTO: 204 K/UL (ref 135–420)
PMV BLD AUTO: 10.4 FL (ref 9.2–11.8)
POTASSIUM SERPL-SCNC: 3.7 MMOL/L (ref 3.5–5.5)
PROT SERPL-MCNC: 8.8 G/DL (ref 6.4–8.2)
PROT UR STRIP-MCNC: NEGATIVE MG/DL
RBC # BLD AUTO: 4.09 M/UL (ref 4.35–5.65)
SODIUM SERPL-SCNC: 137 MMOL/L (ref 136–145)
SP GR UR REFRACTOMETRY: 1.01 (ref 1–1.03)
UROBILINOGEN UR QL STRIP.AUTO: 0.2 EU/DL (ref 0.2–1)
WBC # BLD AUTO: 8.1 K/UL (ref 4.6–13.2)

## 2024-04-14 PROCEDURE — 85025 COMPLETE CBC W/AUTO DIFF WBC: CPT

## 2024-04-14 PROCEDURE — 83690 ASSAY OF LIPASE: CPT

## 2024-04-14 PROCEDURE — 6360000004 HC RX CONTRAST MEDICATION: Performed by: STUDENT IN AN ORGANIZED HEALTH CARE EDUCATION/TRAINING PROGRAM

## 2024-04-14 PROCEDURE — 80076 HEPATIC FUNCTION PANEL: CPT

## 2024-04-14 PROCEDURE — 74177 CT ABD & PELVIS W/CONTRAST: CPT

## 2024-04-14 PROCEDURE — 81003 URINALYSIS AUTO W/O SCOPE: CPT

## 2024-04-14 PROCEDURE — 80048 BASIC METABOLIC PNL TOTAL CA: CPT

## 2024-04-14 RX ORDER — DOCUSATE SODIUM 100 MG/1
100 CAPSULE, LIQUID FILLED ORAL 2 TIMES DAILY
Qty: 60 CAPSULE | Refills: 0 | Status: SHIPPED | OUTPATIENT
Start: 2024-04-14

## 2024-04-14 RX ORDER — POLYETHYLENE GLYCOL 3350 17 G/17G
17 POWDER, FOR SOLUTION ORAL DAILY
Qty: 1 EACH | Refills: 0 | Status: SHIPPED | OUTPATIENT
Start: 2024-04-14 | End: 2024-05-14

## 2024-04-14 RX ADMIN — IOPAMIDOL 80 ML: 612 INJECTION, SOLUTION INTRAVENOUS at 01:39
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
## 2024-04-14 NOTE — ED NOTES
Pt back from CT  
Pt given a sandwich and drink.     PIV removed tip in tack. Discharge instructions given, pt verbalized understanding. All questions answered. Pt ambulatory to Triage.    
English

## 2024-04-14 NOTE — ED PROVIDER NOTES
[facility] EMERGENCY DEPT  EMERGENCY DEPARTMENT ENCOUNTER       Pt Name: Brijesh Hale  MRN: [id number]  Birthdate 1960  Date of evaluation: 4/13/2024  Provider: Anabela Lopez MD   PCP: Adrianna Tejeda MD  Note Started: 2:52 AM 4/13/24     CHIEF COMPLAINT       Chief Complaint   Patient presents with    Multiple complaints        HISTORY OF PRESENT ILLNESS: 1 or more elements      History From: Patient  Mental Health Disorder     Brijesh Hale is a 63 y.o. male with past medical history of schizophrenia who presents to the emergency department for multiple complaints.  Patient states \"I have appendicitis, bronchitis, measles and flu\".  Patient states he has had chills throughout the day today but no documented fevers.  Endorses left forearm pain from a fall approximately 1 week ago and had imaging performed at that time which was negative for fracture.  Denies chest pains or shortness of breath.  No nausea, vomiting or diarrhea.  Denies suicidal or homicidal ideation.  States he is compliant with his schizophrenia medications     Nursing Notes were all reviewed and agreed with or any disagreements were addressed in the HPI.     REVIEW OF SYSTEMS      Review of Systems   Constitutional:  Positive for chills. Negative for fever.   Respiratory:  Negative for cough and shortness of breath.    Cardiovascular:  Negative for chest pain and leg swelling.   Gastrointestinal:  Negative for abdominal pain, diarrhea, nausea and vomiting.   Genitourinary:  Negative for dysuria and frequency.   Musculoskeletal:  Positive for myalgias (Left forearm). Negative for gait problem.   Skin:  Negative for rash and wound.   Neurological:  Negative for light-headedness and headaches.        Positives and Pertinent negatives as per HPI.    PAST HISTORY     Past Medical History:  Past Medical History:   Diagnosis Date    Psychiatric disorder     schizophrenia, depression    Schizophrenia (HCC)          Past Surgical History:  Past

## 2024-04-14 NOTE — DISCHARGE INSTRUCTIONS
Please take MiraLAX daily until having soft bowel movements.    Return to emergency department immediately for any new or worsening symptoms to include abdominal pain, fever, inability to pass stool or any other concerning symptoms.    Please call your primary care physician on Monday to schedule follow-up appointment as soon as possible as well as for reevaluation of your liver enzymes.

## 2024-04-21 ENCOUNTER — HOSPITAL ENCOUNTER (EMERGENCY)
Facility: HOSPITAL | Age: 64
Discharge: HOME | End: 2024-04-21
Payer: MEDICAID

## 2024-04-21 VITALS
WEIGHT: 160 LBS | DIASTOLIC BLOOD PRESSURE: 79 MMHG | OXYGEN SATURATION: 100 % | BODY MASS INDEX: 25.71 KG/M2 | HEIGHT: 66 IN | HEART RATE: 87 BPM | RESPIRATION RATE: 18 BRPM | SYSTOLIC BLOOD PRESSURE: 140 MMHG | TEMPERATURE: 98.1 F

## 2024-04-21 DIAGNOSIS — M79.601 RIGHT ARM PAIN: Primary | ICD-10-CM

## 2024-04-21 PROCEDURE — 6370000000 HC RX 637 (ALT 250 FOR IP): Performed by: NURSE PRACTITIONER

## 2024-04-21 PROCEDURE — 99283 EMERGENCY DEPT VISIT LOW MDM: CPT

## 2024-04-21 RX ORDER — IBUPROFEN 600 MG/1
600 TABLET ORAL
Status: COMPLETED | OUTPATIENT
Start: 2024-04-21 | End: 2024-04-21

## 2024-04-21 RX ADMIN — IBUPROFEN 600 MG: 600 TABLET ORAL at 20:38

## 2024-04-21 ASSESSMENT — PAIN - FUNCTIONAL ASSESSMENT: PAIN_FUNCTIONAL_ASSESSMENT: NONE - DENIES PAIN

## 2024-04-21 NOTE — ED TRIAGE NOTES
Patient presented to the Emergency Dept with a complaint of right arm pain,.    Patient states that he got up too fast and think that \"he broke his right arm\"    Full range of motion noted to arm    Patient alert and oriented x 4, patient breathes freely on room air in nil cardiopulmonary distress     
yesterday

## 2024-04-21 NOTE — ED PROVIDER NOTES
Emergency Department Physician who either signs or Co-signs this chart in the absence of a cardiologist.    RADIOLOGY:   Non-plain film images such as CT, Ultrasound and MRI are read by the radiologist. Plain radiographic images are visualized and preliminarily interpreted by the emergency physician with the below findings:    Radiologic Studies -   No orders to display       The laboratory results, imaging results, and other diagnostic exams were reviewed in the EMR.    Medical Decision Making   I am the first provider for this patient.    I reviewed the vital signs, available nursing notes, past medical history, past surgical history, family history and social history.    Vital Signs-Reviewed the patient's vital signs.    Records Reviewed: Nursing Notes and Old Medical Records Personally, on initial evaluation (Time of Review: 8:45 PM)      ED Course: Progress Notes, Reevaluation, and Consults:  63-year-old male presents to the emergency room with complaints of right arm pain.  States that he had a fall and fell right onto his right arm and thinks that it is broken.  Denies any head injury or loss of consciousness.  No medications taken to alleviate current symptoms.  Presents for evaluation.        Patient able to move right arm completely normal.  Equal  strength and sensation.  Upon palpation no pain.  Patient reports no medications taken for pain.  Patient denies any other medical complaints.  No evidence of any trauma to the patient.  Patient requesting Ace wrap, Medicaid cab and food.      Will obtain lab work and imaging for further evaluation of patients complaint. Will continue to monitor and evaluate patient while in the ED.    Orders as below:  Orders Placed This Encounter   Procedures    Apply ace wrap        MEDICATIONS ADMINISTERED IN THE ED:  Medications   ibuprofen (ADVIL;MOTRIN) tablet 600 mg (600 mg Oral Given 4/21/24 2038)       Care plan outlined and precautions discussed. All medications

## 2024-04-21 NOTE — DISCHARGE INSTRUCTIONS
Apply ice to area. Follow-up with your primary care physician within 2 days for reassessment. Bring the results from this visit with you for their review. Return to the ED immediately for any new, worsening, or persistent symptoms, including fever, vomiting, chest pain, shortness of breath, or any other medical concerns.

## 2024-04-25 ENCOUNTER — HOSPITAL ENCOUNTER (EMERGENCY)
Facility: HOSPITAL | Age: 64
Discharge: HOME | End: 2024-04-25
Payer: MEDICAID

## 2024-04-25 ENCOUNTER — APPOINTMENT (OUTPATIENT)
Facility: HOSPITAL | Age: 64
End: 2024-04-25
Payer: MEDICAID

## 2024-04-25 VITALS
DIASTOLIC BLOOD PRESSURE: 75 MMHG | TEMPERATURE: 98.5 F | HEIGHT: 66 IN | SYSTOLIC BLOOD PRESSURE: 128 MMHG | OXYGEN SATURATION: 99 % | RESPIRATION RATE: 20 BRPM | BODY MASS INDEX: 25.71 KG/M2 | HEART RATE: 88 BPM | WEIGHT: 160 LBS

## 2024-04-25 DIAGNOSIS — M79.672 LEFT FOOT PAIN: Primary | ICD-10-CM

## 2024-04-25 DIAGNOSIS — S93.105A DISLOCATION OF PHALANX OF LEFT FOOT, INITIAL ENCOUNTER: ICD-10-CM

## 2024-04-25 PROCEDURE — 6370000000 HC RX 637 (ALT 250 FOR IP)

## 2024-04-25 PROCEDURE — 6360000002 HC RX W HCPCS: Performed by: EMERGENCY MEDICINE

## 2024-04-25 PROCEDURE — 90714 TD VACC NO PRESV 7 YRS+ IM: CPT | Performed by: EMERGENCY MEDICINE

## 2024-04-25 PROCEDURE — 99284 EMERGENCY DEPT VISIT MOD MDM: CPT

## 2024-04-25 PROCEDURE — 90471 IMMUNIZATION ADMIN: CPT | Performed by: EMERGENCY MEDICINE

## 2024-04-25 PROCEDURE — 73630 X-RAY EXAM OF FOOT: CPT

## 2024-04-25 PROCEDURE — 96372 THER/PROPH/DIAG INJ SC/IM: CPT

## 2024-04-25 RX ORDER — ACETAMINOPHEN 500 MG
500 TABLET ORAL 4 TIMES DAILY PRN
Qty: 360 TABLET | Refills: 0 | Status: SHIPPED | OUTPATIENT
Start: 2024-04-25

## 2024-04-25 RX ORDER — ACETAMINOPHEN 325 MG/1
650 TABLET ORAL
Status: COMPLETED | OUTPATIENT
Start: 2024-04-25 | End: 2024-04-25

## 2024-04-25 RX ADMIN — ACETAMINOPHEN 325MG 650 MG: 325 TABLET ORAL at 19:11

## 2024-04-25 RX ADMIN — CLOSTRIDIUM TETANI TOXOID ANTIGEN (FORMALDEHYDE INACTIVATED) AND CORYNEBACTERIUM DIPHTHERIAE TOXOID ANTIGEN (FORMALDEHYDE INACTIVATED) 0.5 ML: 5; 2 INJECTION, SUSPENSION INTRAMUSCULAR at 19:15

## 2024-04-25 ASSESSMENT — PAIN SCALES - GENERAL: PAINLEVEL_OUTOF10: 7

## 2024-04-25 ASSESSMENT — PAIN DESCRIPTION - ORIENTATION: ORIENTATION: RIGHT

## 2024-04-25 ASSESSMENT — PAIN - FUNCTIONAL ASSESSMENT: PAIN_FUNCTIONAL_ASSESSMENT: NONE - DENIES PAIN

## 2024-04-25 ASSESSMENT — PAIN DESCRIPTION - LOCATION: LOCATION: FOOT

## 2024-04-25 NOTE — ED TRIAGE NOTES
Patient arrived with left foot pain states that a man stepped on his foot. Patient states that his toe nail came off.

## 2024-04-25 NOTE — ED PROVIDER NOTES
afebrile, nontachycardic in no acute distress.  On examination of foot there is no swelling obvious bony deformities, patient did have chronic changes and skin abnormalities however no signs of infection.  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  Patient was placed in a postop shoe and encouraged to follow-up with podiatry.  Patient to return to the ED immediately if worsening or development symptoms    Disposition Considerations :d/c      Critical Care Time:       Brianna Valdes     Diagnosis and Disposition     DIAGNOSIS:   1. Left foot pain    2. Dislocation of phalanx of left foot, initial encounter        DISPOSITION Decision To Discharge 04/25/2024 08:34:43 PM      PATIENT REFERRED TO:  Adrianna Tejeda MD  [address]  [facility] [address]  [facility] [address] [phone]    Go to       Nahum Rees DPM  [address] [facility] [address]  [phone]    Go in 1 day      [facility] EMERGENCY DEPT  [address] [facility] [address]  [phone]  Go to   As needed, If symptoms worsen      DISCHARGE MEDICATIONS:  Discharge Medication List as of 4/25/2024  8:07 PM        START taking these medications    Details   acetaminophen (TYLENOL) 500 MG tablet Take 1 tablet by mouth 4 times daily as needed for Pain, Disp-360 tablet, R-0Normal             DISCONTINUED MEDICATIONS:  Discharge Medication List as of 4/25/2024  8:07 PM                 (Please note that portions of this note were completed with a voice recognition program.  Efforts were made to edit the dictations but occasionally words are mis-transcribed.)    Brianna Valdes PA-C (electronically signed)    AKIRA Valdes PA-C am the primary clinician of record.    [place] Disclaimer     Please note that this dictation was completed with [employer], the computer voice recognition software.  Quite often unanticipated grammatical, syntax, homophones, and

## 2024-04-26 ENCOUNTER — HOSPITAL ENCOUNTER (EMERGENCY)
Facility: HOSPITAL | Age: 64
Discharge: HOME | End: 2024-04-26
Payer: MEDICAID

## 2024-04-26 ENCOUNTER — APPOINTMENT (OUTPATIENT)
Facility: HOSPITAL | Age: 64
End: 2024-04-26
Payer: MEDICAID

## 2024-04-26 VITALS
DIASTOLIC BLOOD PRESSURE: 70 MMHG | HEART RATE: 95 BPM | OXYGEN SATURATION: 99 % | BODY MASS INDEX: 25.71 KG/M2 | HEIGHT: 66 IN | SYSTOLIC BLOOD PRESSURE: 138 MMHG | RESPIRATION RATE: 16 BRPM | WEIGHT: 160 LBS | TEMPERATURE: 98 F

## 2024-04-26 DIAGNOSIS — M79.602 LEFT ARM PAIN: Primary | ICD-10-CM

## 2024-04-26 PROCEDURE — 73030 X-RAY EXAM OF SHOULDER: CPT

## 2024-04-26 PROCEDURE — 99283 EMERGENCY DEPT VISIT LOW MDM: CPT

## 2024-04-26 NOTE — ED NOTES
Patient demonstrates understanding of dc paperwork and instructions. Pt left in stable condition with all belongings. VSS, NAD. Pt left via  ambulatory . Medicaid cab called for pt.

## 2024-04-26 NOTE — DISCHARGE INSTRUCTIONS
You have a chronic dislocation of the little toe, please follow-up with podiatry/foot doctor Dr. Rees, and your primary care doctor.  Patient to take Tylenol as prescribed, and return to the ED immediately worsening or new development of symptoms.

## 2024-04-26 NOTE — ED PROVIDER NOTES
OLANZapine (ZYPREXA) 20 MG tablet Take 40 mg by mouth every evening      risperiDONE (RISPERDAL) 3 MG tablet Take 6 mg by mouth      terbinafine (LAMISIL) 250 MG tablet Take 250 mg by mouth daily      topiramate (TOPAMAX) 50 MG tablet Take 50 mg by mouth 2 times daily (with meals)      traZODone (DESYREL) 100 MG tablet Take 100 mg by mouth         Allergies:  Allergies   Allergen Reactions    Penicillins Other (See Comments)    Oatmeal Nausea And Vomiting       Social Determinants of Health:  Social Determinants of Health     Tobacco Use: High Risk (3/23/2024)    Patient History     Smoking Tobacco Use: Every Day     Smokeless Tobacco Use: Never     Passive Exposure: Not on file   Alcohol Use: Not At Risk (3/23/2024)    AUDIT-C     Frequency of Alcohol Consumption: Never     Average Number of Drinks: Patient does not drink     Frequency of Binge Drinking: Never   Financial Resource Strain: Not on file   Food Insecurity: Not on file   Transportation Needs: Not on file   Physical Activity: Not on file   Stress: Not on file   Social Connections: Not on file   Intimate Partner Violence: Not on file   Depression: Not on file   Housing Stability: Not on file   Interpersonal Safety: Not on file   Utilities: Not on file       Review of Systems   Review of Systems   Negative except as listed above in HPI.    Physical Exam   Physical Exam  Vitals and nursing note reviewed.   Constitutional:       Appearance: Normal appearance.   HENT:      Head: Normocephalic and atraumatic.   Cardiovascular:      Rate and Rhythm: Normal rate and regular rhythm.      Pulses: Normal pulses.      Heart sounds: Normal heart sounds.   Pulmonary:      Effort: Pulmonary effort is normal.   Musculoskeletal:      Cervical back: Normal range of motion.      Comments: Patient is full range of motion, abduction, and adduction, and circumferential shoulder, no obvious bony deformity, neurovascularly intact,   Skin:     General: Skin is warm.

## 2024-04-26 NOTE — ED TRIAGE NOTES
Per EMS, patient complains of left arm pain . Patient states he fell and hurt his left arm. Patient was just seen in the ED last night.